# Patient Record
Sex: MALE | Race: AMERICAN INDIAN OR ALASKA NATIVE | NOT HISPANIC OR LATINO | Employment: FULL TIME | URBAN - METROPOLITAN AREA
[De-identification: names, ages, dates, MRNs, and addresses within clinical notes are randomized per-mention and may not be internally consistent; named-entity substitution may affect disease eponyms.]

---

## 2023-04-25 LAB
ALBUMIN (MG/L) IN URINE: 37.3 MG/L
ALBUMIN/CREATININE (UG/MG) IN URINE: 21.9 UG/MG CRT (ref 0–30)
ANION GAP IN SER/PLAS: 13 MMOL/L (ref 10–20)
APPEARANCE, URINE: CLEAR
BILIRUBIN, URINE: NEGATIVE
BLOOD, URINE: NEGATIVE
C REACTIVE PROTEIN (MG/L) IN SER/PLAS BY HIGH SENSIT: 1 MG/L
CALCIUM (MG/DL) IN SER/PLAS: 9.9 MG/DL (ref 8.6–10.6)
CARBON DIOXIDE, TOTAL (MMOL/L) IN SER/PLAS: 29 MMOL/L (ref 21–32)
CHLORIDE (MMOL/L) IN SER/PLAS: 101 MMOL/L (ref 98–107)
CHOLESTEROL (MG/DL) IN SER/PLAS: 109 MG/DL (ref 0–199)
CHOLESTEROL IN HDL (MG/DL) IN SER/PLAS: 28.7 MG/DL
CHOLESTEROL/HDL RATIO: 3.8
COLOR, URINE: YELLOW
CREATININE (MG/DL) IN SER/PLAS: 0.89 MG/DL (ref 0.5–1.3)
CREATININE (MG/DL) IN URINE: 170 MG/DL (ref 20–370)
ESTIMATED AVERAGE GLUCOSE FOR HBA1C: 163 MG/DL
GFR MALE: >90 ML/MIN/1.73M2
GLUCOSE (MG/DL) IN SER/PLAS: 210 MG/DL (ref 74–99)
GLUCOSE, URINE: NEGATIVE MG/DL
HEMOGLOBIN A1C/HEMOGLOBIN TOTAL IN BLOOD: 7.3 %
KETONES, URINE: NEGATIVE MG/DL
LDL: 54 MG/DL (ref 0–99)
LEUKOCYTE ESTERASE, URINE: NEGATIVE
NATRIURETIC PEPTIDE B (PG/ML) IN SER/PLAS: 96 PG/ML (ref 0–99)
NITRITE, URINE: NEGATIVE
PH, URINE: 5 (ref 5–8)
POTASSIUM (MMOL/L) IN SER/PLAS: 4 MMOL/L (ref 3.5–5.3)
PROSTATE SPECIFIC AG (NG/ML) IN SER/PLAS: 0.69 NG/ML (ref 0–4)
PROTEIN, URINE: NEGATIVE MG/DL
SODIUM (MMOL/L) IN SER/PLAS: 139 MMOL/L (ref 136–145)
SPECIFIC GRAVITY, URINE: 1.02 (ref 1–1.03)
TRIGLYCERIDE (MG/DL) IN SER/PLAS: 134 MG/DL (ref 0–149)
UREA NITROGEN (MG/DL) IN SER/PLAS: 16 MG/DL (ref 6–23)
UROBILINOGEN, URINE: <2 MG/DL (ref 0–1.9)
VLDL: 27 MG/DL (ref 0–40)

## 2023-04-27 LAB — LIPOPROTEIN A SER/PLAS: <6 MG/DL

## 2023-04-29 LAB
CHOLESTEROL, TOTAL(NMR): 113 MG/DL (ref 100–199)
HDL-C(NMR): 26 MG/DL
HDL-P (TOTAL)(NMR): 22.2 UMOL/L
LDL AND HDL PARTICLES -DATA CONVERSION: ABNORMAL
LDL SIZE(NMR): 20.6 NM
LDL-C (NIH CALC)(NMR): 64 MG/DL (ref 0–99)
LDL-P(NMR): 874 NMOL/L
LP-IR SCORE(NMR): 76
SMALL LDL-P(NMR): 357 NMOL/L
TRIGLYCERIDES(NMR): 124 MG/DL (ref 0–149)

## 2023-05-22 ENCOUNTER — HOSPITAL ENCOUNTER (OUTPATIENT)
Dept: DATA CONVERSION | Facility: HOSPITAL | Age: 63
End: 2023-05-22
Attending: INTERNAL MEDICINE | Admitting: INTERNAL MEDICINE

## 2023-05-22 DIAGNOSIS — G47.33 OBSTRUCTIVE SLEEP APNEA (ADULT) (PEDIATRIC): ICD-10-CM

## 2023-05-22 DIAGNOSIS — Z87.891 PERSONAL HISTORY OF NICOTINE DEPENDENCE: ICD-10-CM

## 2023-05-22 DIAGNOSIS — I25.118 ATHEROSCLEROTIC HEART DISEASE OF NATIVE CORONARY ARTERY WITH OTHER FORMS OF ANGINA PECTORIS (CMS-HCC): ICD-10-CM

## 2023-05-22 DIAGNOSIS — E66.9 OBESITY, UNSPECIFIED: ICD-10-CM

## 2023-05-22 DIAGNOSIS — I10 ESSENTIAL (PRIMARY) HYPERTENSION: ICD-10-CM

## 2023-05-22 DIAGNOSIS — I35.0 NONRHEUMATIC AORTIC (VALVE) STENOSIS: ICD-10-CM

## 2023-05-22 DIAGNOSIS — E78.5 HYPERLIPIDEMIA, UNSPECIFIED: ICD-10-CM

## 2023-05-22 DIAGNOSIS — I71.21 ANEURYSM OF THE ASCENDING AORTA, WITHOUT RUPTURE (CMS-HCC): ICD-10-CM

## 2023-05-22 DIAGNOSIS — E11.51 TYPE 2 DIABETES MELLITUS WITH DIABETIC PERIPHERAL ANGIOPATHY WITHOUT GANGRENE (MULTI): ICD-10-CM

## 2023-05-22 DIAGNOSIS — Z79.84 LONG TERM (CURRENT) USE OF ORAL HYPOGLYCEMIC DRUGS: ICD-10-CM

## 2023-05-22 LAB
ERYTHROCYTE DISTRIBUTION WIDTH (RATIO) BY AUTOMATED COUNT: 12.5 % (ref 11.5–14.5)
ERYTHROCYTE MEAN CORPUSCULAR HEMOGLOBIN CONCENTRATION (G/DL) BY AUTOMATED: 34.8 G/DL (ref 32–36)
ERYTHROCYTE MEAN CORPUSCULAR VOLUME (FL) BY AUTOMATED COUNT: 90 FL (ref 80–100)
ERYTHROCYTES (10*6/UL) IN BLOOD BY AUTOMATED COUNT: 4.77 X10E12/L (ref 4.5–5.9)
HEMATOCRIT (%) IN BLOOD BY AUTOMATED COUNT: 42.8 % (ref 41–52)
HEMOGLOBIN (G/DL) IN BLOOD: 14.9 G/DL (ref 13.5–17.5)
LEUKOCYTES (10*3/UL) IN BLOOD BY AUTOMATED COUNT: 8.5 X10E9/L (ref 4.4–11.3)
NRBC (PER 100 WBCS) BY AUTOMATED COUNT: 0 /100 WBC (ref 0–0)
PLATELETS (10*3/UL) IN BLOOD AUTOMATED COUNT: 289 X10E9/L (ref 150–450)

## 2023-05-23 LAB
ATRIAL RATE: 73 BPM
P AXIS: 20 DEGREES
P OFFSET: 172 MS
P ONSET: 104 MS
PR INTERVAL: 176 MS
Q ONSET: 215 MS
QRS COUNT: 12 BEATS
QRS DURATION: 116 MS
QT INTERVAL: 420 MS
QTC CALCULATION(BAZETT): 462 MS
QTC FREDERICIA: 448 MS
R AXIS: -12 DEGREES
T AXIS: 140 DEGREES
T OFFSET: 425 MS
VENTRICULAR RATE: 73 BPM

## 2023-09-26 ENCOUNTER — OFFICE VISIT (OUTPATIENT)
Dept: PRIMARY CARE | Facility: CLINIC | Age: 63
End: 2023-09-26
Payer: COMMERCIAL

## 2023-09-26 VITALS
DIASTOLIC BLOOD PRESSURE: 77 MMHG | SYSTOLIC BLOOD PRESSURE: 113 MMHG | WEIGHT: 297 LBS | HEART RATE: 73 BPM | BODY MASS INDEX: 36.93 KG/M2 | OXYGEN SATURATION: 94 % | HEIGHT: 75 IN

## 2023-09-26 DIAGNOSIS — I10 BENIGN ESSENTIAL HYPERTENSION: ICD-10-CM

## 2023-09-26 DIAGNOSIS — E11.69 TYPE 2 DIABETES MELLITUS WITH OTHER SPECIFIED COMPLICATION, WITHOUT LONG-TERM CURRENT USE OF INSULIN (MULTI): Primary | ICD-10-CM

## 2023-09-26 DIAGNOSIS — E78.00 HYPERCHOLESTEREMIA: ICD-10-CM

## 2023-09-26 DIAGNOSIS — R73.9 HYPERGLYCEMIA: ICD-10-CM

## 2023-09-26 PROCEDURE — 3078F DIAST BP <80 MM HG: CPT | Performed by: FAMILY MEDICINE

## 2023-09-26 PROCEDURE — 3008F BODY MASS INDEX DOCD: CPT | Performed by: FAMILY MEDICINE

## 2023-09-26 PROCEDURE — 3044F HG A1C LEVEL LT 7.0%: CPT | Performed by: FAMILY MEDICINE

## 2023-09-26 PROCEDURE — 99214 OFFICE O/P EST MOD 30 MIN: CPT | Performed by: FAMILY MEDICINE

## 2023-09-26 PROCEDURE — 4010F ACE/ARB THERAPY RXD/TAKEN: CPT | Performed by: FAMILY MEDICINE

## 2023-09-26 PROCEDURE — 3074F SYST BP LT 130 MM HG: CPT | Performed by: FAMILY MEDICINE

## 2023-09-26 RX ORDER — PANTOPRAZOLE SODIUM 40 MG/1
40 TABLET, DELAYED RELEASE ORAL
COMMUNITY
Start: 2023-09-14 | End: 2023-11-02 | Stop reason: SDUPTHER

## 2023-09-26 RX ORDER — ALBUTEROL SULFATE 90 UG/1
AEROSOL, METERED RESPIRATORY (INHALATION)
COMMUNITY
End: 2023-11-02 | Stop reason: ALTCHOICE

## 2023-09-26 RX ORDER — METOPROLOL TARTRATE 50 MG/1
50 TABLET ORAL 2 TIMES DAILY
COMMUNITY
Start: 2023-09-14 | End: 2023-11-08 | Stop reason: SDUPTHER

## 2023-09-26 RX ORDER — METFORMIN HYDROCHLORIDE 500 MG/1
500 TABLET ORAL
COMMUNITY
Start: 2023-09-14 | End: 2023-11-08 | Stop reason: SDUPTHER

## 2023-09-26 RX ORDER — HYDROCHLOROTHIAZIDE 25 MG/1
TABLET ORAL
COMMUNITY
End: 2023-10-31 | Stop reason: SDUPTHER

## 2023-09-26 RX ORDER — ATORVASTATIN CALCIUM 80 MG/1
80 TABLET, FILM COATED ORAL DAILY
COMMUNITY
Start: 2023-09-14 | End: 2023-10-31 | Stop reason: SDUPTHER

## 2023-09-26 RX ORDER — AMIODARONE HYDROCHLORIDE 200 MG/1
TABLET ORAL
COMMUNITY
Start: 2023-09-14 | End: 2023-11-02 | Stop reason: SDUPTHER

## 2023-09-26 RX ORDER — AMLODIPINE BESYLATE 5 MG/1
5 TABLET ORAL DAILY
COMMUNITY
End: 2023-10-31 | Stop reason: SDUPTHER

## 2023-09-26 RX ORDER — LOSARTAN POTASSIUM 100 MG/1
100 TABLET ORAL DAILY
COMMUNITY
Start: 2023-06-16 | End: 2023-10-25 | Stop reason: ENTERED-IN-ERROR

## 2023-09-26 ASSESSMENT — ENCOUNTER SYMPTOMS
APPETITE CHANGE: 1
MUSCULOSKELETAL NEGATIVE: 1
RESPIRATORY NEGATIVE: 1
FATIGUE: 1

## 2023-09-26 NOTE — PROGRESS NOTES
Subjective   Patient ID: Jules Neves is a 63 y.o. male who presents for Follow-up (Nursing home).  HPI  Sp hosp [facility] doing better now  Review of Systems   Constitutional:  Positive for appetite change and fatigue.   HENT: Negative.     Respiratory: Negative.     Cardiovascular:  Positive for chest pain.         chest pain sp surgery    Genitourinary: Negative.    Musculoskeletal: Negative.        Objective   Physical Exam  Constitutional:       Appearance: Normal appearance.   HENT:      Head: Normocephalic.      Nose: Nose normal.      Mouth/Throat:      Mouth: Mucous membranes are moist.   Eyes:      Extraocular Movements: Extraocular movements intact.      Pupils: Pupils are equal, round, and reactive to light.   Cardiovascular:      Rate and Rhythm: Normal rate and regular rhythm.   Pulmonary:      Effort: Pulmonary effort is normal.   Musculoskeletal:         General: Normal range of motion.      Cervical back: Normal range of motion.   Skin:     General: Skin is warm and dry.   Neurological:      Mental Status: He is alert.         Assessment/Plan   Diagnoses and all orders for this visit:  Type 2 diabetes mellitus with other specified complication, without long-term current use of insulin (CMS/[facility])  -     Hemoglobin A1C; Future  -     Basic Metabolic Panel; Future  Hyperglycemia  Hypercholesteremia  Benign essential hypertension

## 2023-10-04 DIAGNOSIS — Z95.828 S/P ASCENDING AORTIC REPLACEMENT: Primary | ICD-10-CM

## 2023-10-17 ENCOUNTER — TELEPHONE (OUTPATIENT)
Dept: PRIMARY CARE | Facility: CLINIC | Age: 63
End: 2023-10-17
Payer: COMMERCIAL

## 2023-10-17 NOTE — TELEPHONE ENCOUNTER
Home nurse called for this patient. Said he has been feeling light headed on Metformin. They would like to start checking his blood sugar. They were hoping you could call in a meter, test strips, and lancets.

## 2023-10-20 DIAGNOSIS — E11.69 TYPE 2 DIABETES MELLITUS WITH OTHER SPECIFIED COMPLICATION, WITHOUT LONG-TERM CURRENT USE OF INSULIN (MULTI): Primary | ICD-10-CM

## 2023-10-24 ENCOUNTER — APPOINTMENT (OUTPATIENT)
Dept: RADIOLOGY | Facility: HOSPITAL | Age: 63
End: 2023-10-24
Payer: COMMERCIAL

## 2023-10-24 ENCOUNTER — HOSPITAL ENCOUNTER (OUTPATIENT)
Facility: HOSPITAL | Age: 63
Setting detail: OBSERVATION
Discharge: HOME | End: 2023-10-25
Attending: EMERGENCY MEDICINE | Admitting: INTERNAL MEDICINE
Payer: COMMERCIAL

## 2023-10-24 DIAGNOSIS — R79.89 ELEVATED BRAIN NATRIURETIC PEPTIDE (BNP) LEVEL: ICD-10-CM

## 2023-10-24 DIAGNOSIS — R79.89 ELEVATED TROPONIN: ICD-10-CM

## 2023-10-24 DIAGNOSIS — R00.2 PALPITATIONS: Primary | ICD-10-CM

## 2023-10-24 DIAGNOSIS — I97.89 PERICARDIAL EFFUSION AFTER OPERATIVE PROCEDURE (HHS-HCC): ICD-10-CM

## 2023-10-24 DIAGNOSIS — I31.39 PERICARDIAL EFFUSION AFTER OPERATIVE PROCEDURE (HHS-HCC): ICD-10-CM

## 2023-10-24 LAB
ALBUMIN SERPL BCP-MCNC: 4.2 G/DL (ref 3.4–5)
ALP SERPL-CCNC: 74 U/L (ref 33–136)
ALT SERPL W P-5'-P-CCNC: 30 U/L (ref 10–52)
ANION GAP SERPL CALC-SCNC: 14 MMOL/L (ref 10–20)
AST SERPL W P-5'-P-CCNC: 31 U/L (ref 9–39)
BASOPHILS # BLD AUTO: 0.06 X10*3/UL (ref 0–0.1)
BASOPHILS NFR BLD AUTO: 0.7 %
BILIRUB SERPL-MCNC: 0.8 MG/DL (ref 0–1.2)
BNP SERPL-MCNC: 560 PG/ML (ref 0–99)
BUN SERPL-MCNC: 9 MG/DL (ref 6–23)
CALCIUM SERPL-MCNC: 9.6 MG/DL (ref 8.6–10.3)
CARDIAC TROPONIN I PNL SERPL HS: 78 NG/L (ref 0–20)
CARDIAC TROPONIN I PNL SERPL HS: 80 NG/L (ref 0–20)
CHLORIDE SERPL-SCNC: 100 MMOL/L (ref 98–107)
CO2 SERPL-SCNC: 25 MMOL/L (ref 21–32)
CREAT SERPL-MCNC: 0.81 MG/DL (ref 0.5–1.3)
EOSINOPHIL # BLD AUTO: 0.35 X10*3/UL (ref 0–0.7)
EOSINOPHIL NFR BLD AUTO: 4.1 %
ERYTHROCYTE [DISTWIDTH] IN BLOOD BY AUTOMATED COUNT: 13.1 % (ref 11.5–14.5)
GFR SERPL CREATININE-BSD FRML MDRD: >90 ML/MIN/1.73M*2
GLUCOSE SERPL-MCNC: 132 MG/DL (ref 74–99)
HCT VFR BLD AUTO: 43.9 % (ref 41–52)
HGB BLD-MCNC: 15 G/DL (ref 13.5–17.5)
IMM GRANULOCYTES # BLD AUTO: 0.02 X10*3/UL (ref 0–0.7)
IMM GRANULOCYTES NFR BLD AUTO: 0.2 % (ref 0–0.9)
LYMPHOCYTES # BLD AUTO: 2.37 X10*3/UL (ref 1.2–4.8)
LYMPHOCYTES NFR BLD AUTO: 27.9 %
MAGNESIUM SERPL-MCNC: 1.71 MG/DL (ref 1.6–2.4)
MCH RBC QN AUTO: 30.6 PG (ref 26–34)
MCHC RBC AUTO-ENTMCNC: 34.2 G/DL (ref 32–36)
MCV RBC AUTO: 90 FL (ref 80–100)
MONOCYTES # BLD AUTO: 0.67 X10*3/UL (ref 0.1–1)
MONOCYTES NFR BLD AUTO: 7.9 %
NEUTROPHILS # BLD AUTO: 5.03 X10*3/UL (ref 1.2–7.7)
NEUTROPHILS NFR BLD AUTO: 59.2 %
NRBC BLD-RTO: 0 /100 WBCS (ref 0–0)
PLATELET # BLD AUTO: 252 X10*3/UL (ref 150–450)
PMV BLD AUTO: 10.1 FL (ref 7.5–11.5)
POTASSIUM SERPL-SCNC: 3.2 MMOL/L (ref 3.5–5.3)
PROT SERPL-MCNC: 6.9 G/DL (ref 6.4–8.2)
RBC # BLD AUTO: 4.9 X10*6/UL (ref 4.5–5.9)
SODIUM SERPL-SCNC: 136 MMOL/L (ref 136–145)
WBC # BLD AUTO: 8.5 X10*3/UL (ref 4.4–11.3)

## 2023-10-24 PROCEDURE — 2500000004 HC RX 250 GENERAL PHARMACY W/ HCPCS (ALT 636 FOR OP/ED): Performed by: EMERGENCY MEDICINE

## 2023-10-24 PROCEDURE — 71045 X-RAY EXAM CHEST 1 VIEW: CPT | Mod: FY

## 2023-10-24 PROCEDURE — 36415 COLL VENOUS BLD VENIPUNCTURE: CPT | Performed by: NURSE PRACTITIONER

## 2023-10-24 PROCEDURE — 85025 COMPLETE CBC W/AUTO DIFF WBC: CPT | Performed by: NURSE PRACTITIONER

## 2023-10-24 PROCEDURE — 99285 EMERGENCY DEPT VISIT HI MDM: CPT | Mod: 25 | Performed by: EMERGENCY MEDICINE

## 2023-10-24 PROCEDURE — 71045 X-RAY EXAM CHEST 1 VIEW: CPT | Performed by: RADIOLOGY

## 2023-10-24 PROCEDURE — 83880 ASSAY OF NATRIURETIC PEPTIDE: CPT | Performed by: NURSE PRACTITIONER

## 2023-10-24 PROCEDURE — 83735 ASSAY OF MAGNESIUM: CPT | Performed by: NURSE PRACTITIONER

## 2023-10-24 PROCEDURE — 80053 COMPREHEN METABOLIC PANEL: CPT | Performed by: NURSE PRACTITIONER

## 2023-10-24 PROCEDURE — 84484 ASSAY OF TROPONIN QUANT: CPT | Performed by: NURSE PRACTITIONER

## 2023-10-24 PROCEDURE — 71275 CT ANGIOGRAPHY CHEST: CPT

## 2023-10-24 RX ORDER — POTASSIUM CHLORIDE 20 MEQ/1
40 TABLET, EXTENDED RELEASE ORAL ONCE
Status: COMPLETED | OUTPATIENT
Start: 2023-10-24 | End: 2023-10-24

## 2023-10-24 RX ADMIN — POTASSIUM CHLORIDE 40 MEQ: 1500 TABLET, EXTENDED RELEASE ORAL at 23:21

## 2023-10-24 ASSESSMENT — COLUMBIA-SUICIDE SEVERITY RATING SCALE - C-SSRS
1. IN THE PAST MONTH, HAVE YOU WISHED YOU WERE DEAD OR WISHED YOU COULD GO TO SLEEP AND NOT WAKE UP?: NO
6. HAVE YOU EVER DONE ANYTHING, STARTED TO DO ANYTHING, OR PREPARED TO DO ANYTHING TO END YOUR LIFE?: NO
2. HAVE YOU ACTUALLY HAD ANY THOUGHTS OF KILLING YOURSELF?: NO

## 2023-10-24 ASSESSMENT — LIFESTYLE VARIABLES
EVER FELT BAD OR GUILTY ABOUT YOUR DRINKING: NO
EVER HAD A DRINK FIRST THING IN THE MORNING TO STEADY YOUR NERVES TO GET RID OF A HANGOVER: NO
HAVE PEOPLE ANNOYED YOU BY CRITICIZING YOUR DRINKING: NO
HAVE YOU EVER FELT YOU SHOULD CUT DOWN ON YOUR DRINKING: NO
REASON UNABLE TO ASSESS: YES

## 2023-10-25 VITALS
HEIGHT: 75 IN | RESPIRATION RATE: 16 BRPM | OXYGEN SATURATION: 98 % | DIASTOLIC BLOOD PRESSURE: 70 MMHG | TEMPERATURE: 98.4 F | BODY MASS INDEX: 36.06 KG/M2 | SYSTOLIC BLOOD PRESSURE: 146 MMHG | HEART RATE: 73 BPM | WEIGHT: 290 LBS

## 2023-10-25 PROBLEM — R00.2 PALPITATIONS: Status: ACTIVE | Noted: 2023-10-25

## 2023-10-25 LAB
ANION GAP SERPL CALC-SCNC: 14 MMOL/L (ref 10–20)
BUN SERPL-MCNC: 8 MG/DL (ref 6–23)
CALCIUM SERPL-MCNC: 9 MG/DL (ref 8.6–10.3)
CHLORIDE SERPL-SCNC: 101 MMOL/L (ref 98–107)
CO2 SERPL-SCNC: 27 MMOL/L (ref 21–32)
CREAT SERPL-MCNC: 0.73 MG/DL (ref 0.5–1.3)
ERYTHROCYTE [DISTWIDTH] IN BLOOD BY AUTOMATED COUNT: 13.3 % (ref 11.5–14.5)
GFR SERPL CREATININE-BSD FRML MDRD: >90 ML/MIN/1.73M*2
GLUCOSE BLD MANUAL STRIP-MCNC: 102 MG/DL (ref 74–99)
GLUCOSE SERPL-MCNC: 96 MG/DL (ref 74–99)
HCT VFR BLD AUTO: 42.6 % (ref 41–52)
HGB BLD-MCNC: 14.4 G/DL (ref 13.5–17.5)
HOLD SPECIMEN: NORMAL
MCH RBC QN AUTO: 31.2 PG (ref 26–34)
MCHC RBC AUTO-ENTMCNC: 33.8 G/DL (ref 32–36)
MCV RBC AUTO: 92 FL (ref 80–100)
NRBC BLD-RTO: 0 /100 WBCS (ref 0–0)
PLATELET # BLD AUTO: 251 X10*3/UL (ref 150–450)
PMV BLD AUTO: 10.1 FL (ref 7.5–11.5)
POTASSIUM SERPL-SCNC: 3.7 MMOL/L (ref 3.5–5.3)
RBC # BLD AUTO: 4.62 X10*6/UL (ref 4.5–5.9)
SODIUM SERPL-SCNC: 138 MMOL/L (ref 136–145)
WBC # BLD AUTO: 8.1 X10*3/UL (ref 4.4–11.3)

## 2023-10-25 PROCEDURE — 2500000001 HC RX 250 WO HCPCS SELF ADMINISTERED DRUGS (ALT 637 FOR MEDICARE OP): Performed by: INTERNAL MEDICINE

## 2023-10-25 PROCEDURE — 71275 CT ANGIOGRAPHY CHEST: CPT | Performed by: RADIOLOGY

## 2023-10-25 PROCEDURE — 36415 COLL VENOUS BLD VENIPUNCTURE: CPT | Performed by: INTERNAL MEDICINE

## 2023-10-25 PROCEDURE — 82947 ASSAY GLUCOSE BLOOD QUANT: CPT

## 2023-10-25 PROCEDURE — 99222 1ST HOSP IP/OBS MODERATE 55: CPT

## 2023-10-25 PROCEDURE — 80048 BASIC METABOLIC PNL TOTAL CA: CPT | Performed by: INTERNAL MEDICINE

## 2023-10-25 PROCEDURE — G0378 HOSPITAL OBSERVATION PER HR: HCPCS

## 2023-10-25 PROCEDURE — 85027 COMPLETE CBC AUTOMATED: CPT | Performed by: INTERNAL MEDICINE

## 2023-10-25 PROCEDURE — 2500000001 HC RX 250 WO HCPCS SELF ADMINISTERED DRUGS (ALT 637 FOR MEDICARE OP)

## 2023-10-25 PROCEDURE — 2550000001 HC RX 255 CONTRASTS: Performed by: EMERGENCY MEDICINE

## 2023-10-25 PROCEDURE — 99236 HOSP IP/OBS SAME DATE HI 85: CPT | Performed by: INTERNAL MEDICINE

## 2023-10-25 RX ORDER — ACETAMINOPHEN 160 MG/5ML
650 SOLUTION ORAL EVERY 4 HOURS PRN
Status: DISCONTINUED | OUTPATIENT
Start: 2023-10-25 | End: 2023-10-25 | Stop reason: HOSPADM

## 2023-10-25 RX ORDER — ONDANSETRON HYDROCHLORIDE 2 MG/ML
4 INJECTION, SOLUTION INTRAVENOUS EVERY 8 HOURS PRN
Status: DISCONTINUED | OUTPATIENT
Start: 2023-10-25 | End: 2023-10-25 | Stop reason: HOSPADM

## 2023-10-25 RX ORDER — NAPROXEN SODIUM 220 MG/1
81 TABLET, FILM COATED ORAL DAILY
Status: DISCONTINUED | OUTPATIENT
Start: 2023-10-25 | End: 2023-10-25 | Stop reason: HOSPADM

## 2023-10-25 RX ORDER — ACETAMINOPHEN 650 MG/1
650 SUPPOSITORY RECTAL EVERY 4 HOURS PRN
Status: DISCONTINUED | OUTPATIENT
Start: 2023-10-25 | End: 2023-10-25 | Stop reason: HOSPADM

## 2023-10-25 RX ORDER — FAMOTIDINE 10 MG/ML
20 INJECTION INTRAVENOUS 2 TIMES DAILY
Status: DISCONTINUED | OUTPATIENT
Start: 2023-10-25 | End: 2023-10-25 | Stop reason: HOSPADM

## 2023-10-25 RX ORDER — DEXTROSE MONOHYDRATE 100 MG/ML
0.3 INJECTION, SOLUTION INTRAVENOUS ONCE AS NEEDED
Status: DISCONTINUED | OUTPATIENT
Start: 2023-10-25 | End: 2023-10-25 | Stop reason: HOSPADM

## 2023-10-25 RX ORDER — FAMOTIDINE 20 MG/1
20 TABLET, FILM COATED ORAL 2 TIMES DAILY
Status: DISCONTINUED | OUTPATIENT
Start: 2023-10-25 | End: 2023-10-25 | Stop reason: HOSPADM

## 2023-10-25 RX ORDER — POLYETHYLENE GLYCOL 3350 17 G/17G
17 POWDER, FOR SOLUTION ORAL DAILY PRN
Status: DISCONTINUED | OUTPATIENT
Start: 2023-10-25 | End: 2023-10-25 | Stop reason: HOSPADM

## 2023-10-25 RX ORDER — ONDANSETRON 4 MG/1
4 TABLET, FILM COATED ORAL EVERY 8 HOURS PRN
Status: DISCONTINUED | OUTPATIENT
Start: 2023-10-25 | End: 2023-10-25 | Stop reason: HOSPADM

## 2023-10-25 RX ORDER — TALC
3 POWDER (GRAM) TOPICAL DAILY
Status: DISCONTINUED | OUTPATIENT
Start: 2023-10-25 | End: 2023-10-25 | Stop reason: HOSPADM

## 2023-10-25 RX ORDER — INSULIN LISPRO 100 [IU]/ML
0-10 INJECTION, SOLUTION INTRAVENOUS; SUBCUTANEOUS
Status: DISCONTINUED | OUTPATIENT
Start: 2023-10-25 | End: 2023-10-25 | Stop reason: HOSPADM

## 2023-10-25 RX ORDER — ENOXAPARIN SODIUM 100 MG/ML
40 INJECTION SUBCUTANEOUS EVERY 24 HOURS
Status: DISCONTINUED | OUTPATIENT
Start: 2023-10-25 | End: 2023-10-25 | Stop reason: HOSPADM

## 2023-10-25 RX ORDER — POTASSIUM CHLORIDE 1.5 G/1.58G
40 POWDER, FOR SOLUTION ORAL ONCE
Status: COMPLETED | OUTPATIENT
Start: 2023-10-25 | End: 2023-10-25

## 2023-10-25 RX ORDER — ACETAMINOPHEN 325 MG/1
650 TABLET ORAL EVERY 4 HOURS PRN
Status: DISCONTINUED | OUTPATIENT
Start: 2023-10-25 | End: 2023-10-25 | Stop reason: HOSPADM

## 2023-10-25 RX ORDER — DEXTROSE 50 % IN WATER (D50W) INTRAVENOUS SYRINGE
25
Status: DISCONTINUED | OUTPATIENT
Start: 2023-10-25 | End: 2023-10-25 | Stop reason: HOSPADM

## 2023-10-25 RX ADMIN — FAMOTIDINE 20 MG: 20 TABLET ORAL at 09:17

## 2023-10-25 RX ADMIN — POTASSIUM CHLORIDE 40 MEQ: 1.5 POWDER, FOR SOLUTION ORAL at 09:20

## 2023-10-25 RX ADMIN — ASPIRIN 81 MG 81 MG: 81 TABLET ORAL at 09:17

## 2023-10-25 RX ADMIN — IOHEXOL 80 ML: 350 INJECTION, SOLUTION INTRAVENOUS at 00:06

## 2023-10-25 SDOH — SOCIAL STABILITY: SOCIAL INSECURITY: HAS ANYONE EVER THREATENED TO HURT YOUR FAMILY OR YOUR PETS?: NO

## 2023-10-25 SDOH — SOCIAL STABILITY: SOCIAL INSECURITY: ABUSE: ADULT

## 2023-10-25 SDOH — SOCIAL STABILITY: SOCIAL INSECURITY: HAVE YOU HAD THOUGHTS OF HARMING ANYONE ELSE?: NO

## 2023-10-25 SDOH — SOCIAL STABILITY: SOCIAL INSECURITY: ARE YOU OR HAVE YOU BEEN THREATENED OR ABUSED PHYSICALLY, EMOTIONALLY, OR SEXUALLY BY ANYONE?: NO

## 2023-10-25 SDOH — SOCIAL STABILITY: SOCIAL INSECURITY: DO YOU FEEL UNSAFE GOING BACK TO THE PLACE WHERE YOU ARE LIVING?: NO

## 2023-10-25 SDOH — SOCIAL STABILITY: SOCIAL INSECURITY: ARE THERE ANY APPARENT SIGNS OF INJURIES/BEHAVIORS THAT COULD BE RELATED TO ABUSE/NEGLECT?: NO

## 2023-10-25 SDOH — SOCIAL STABILITY: SOCIAL INSECURITY: WERE YOU ABLE TO COMPLETE ALL THE BEHAVIORAL HEALTH SCREENINGS?: YES

## 2023-10-25 SDOH — SOCIAL STABILITY: SOCIAL INSECURITY: DO YOU FEEL ANYONE HAS EXPLOITED OR TAKEN ADVANTAGE OF YOU FINANCIALLY OR OF YOUR PERSONAL PROPERTY?: NO

## 2023-10-25 SDOH — SOCIAL STABILITY: SOCIAL INSECURITY: DOES ANYONE TRY TO KEEP YOU FROM HAVING/CONTACTING OTHER FRIENDS OR DOING THINGS OUTSIDE YOUR HOME?: NO

## 2023-10-25 ASSESSMENT — COLUMBIA-SUICIDE SEVERITY RATING SCALE - C-SSRS
2. HAVE YOU ACTUALLY HAD ANY THOUGHTS OF KILLING YOURSELF?: NO
6. HAVE YOU EVER DONE ANYTHING, STARTED TO DO ANYTHING, OR PREPARED TO DO ANYTHING TO END YOUR LIFE?: NO
1. IN THE PAST MONTH, HAVE YOU WISHED YOU WERE DEAD OR WISHED YOU COULD GO TO SLEEP AND NOT WAKE UP?: NO

## 2023-10-25 ASSESSMENT — ENCOUNTER SYMPTOMS
DYSURIA: 0
CONFUSION: 0
VOMITING: 0
EYE PAIN: 0
ABDOMINAL PAIN: 0
HEMATURIA: 0
FREQUENCY: 0
HEADACHES: 0
WOUND: 0
FLANK PAIN: 0
CHILLS: 0
BACK PAIN: 0
DIFFICULTY URINATING: 0
DIARRHEA: 0
NECK PAIN: 0
WHEEZING: 0
SHORTNESS OF BREATH: 0
SORE THROAT: 0
FEVER: 0
LIGHT-HEADEDNESS: 0
CONSTIPATION: 0
SINUS PRESSURE: 0
FACIAL SWELLING: 0
PALPITATIONS: 0
COUGH: 0
EYE REDNESS: 0
HALLUCINATIONS: 0
SLEEP DISTURBANCE: 0
JOINT SWELLING: 0
POLYDIPSIA: 0
SEIZURES: 0
NAUSEA: 0
BLOOD IN STOOL: 0
DIZZINESS: 0
APPETITE CHANGE: 0

## 2023-10-25 ASSESSMENT — ACTIVITIES OF DAILY LIVING (ADL)
FEEDING YOURSELF: INDEPENDENT
PATIENT'S MEMORY ADEQUATE TO SAFELY COMPLETE DAILY ACTIVITIES?: YES
DRESSING YOURSELF: INDEPENDENT
ADEQUATE_TO_COMPLETE_ADL: YES
BATHING: INDEPENDENT
JUDGMENT_ADEQUATE_SAFELY_COMPLETE_DAILY_ACTIVITIES: YES
LACK_OF_TRANSPORTATION: NO
HEARING - RIGHT EAR: FUNCTIONAL
WALKS IN HOME: NEEDS ASSISTANCE
HEARING - LEFT EAR: FUNCTIONAL
GROOMING: INDEPENDENT
TOILETING: NEEDS ASSISTANCE

## 2023-10-25 ASSESSMENT — COGNITIVE AND FUNCTIONAL STATUS - GENERAL
WALKING IN HOSPITAL ROOM: A LITTLE
MOBILITY SCORE: 22
PATIENT BASELINE BEDBOUND: NO
TOILETING: A LITTLE
CLIMB 3 TO 5 STEPS WITH RAILING: A LITTLE
DAILY ACTIVITIY SCORE: 23

## 2023-10-25 ASSESSMENT — LIFESTYLE VARIABLES
HOW OFTEN DO YOU HAVE 6 OR MORE DRINKS ON ONE OCCASION: NEVER
SKIP TO QUESTIONS 9-10: 1
HOW OFTEN DO YOU HAVE A DRINK CONTAINING ALCOHOL: NEVER
HOW MANY STANDARD DRINKS CONTAINING ALCOHOL DO YOU HAVE ON A TYPICAL DAY: PATIENT DOES NOT DRINK
PRESCIPTION_ABUSE_PAST_12_MONTHS: NO
SUBSTANCE_ABUSE_PAST_12_MONTHS: NO
AUDIT-C TOTAL SCORE: 0
AUDIT-C TOTAL SCORE: 0

## 2023-10-25 ASSESSMENT — PATIENT HEALTH QUESTIONNAIRE - PHQ9
2. FEELING DOWN, DEPRESSED OR HOPELESS: NOT AT ALL
1. LITTLE INTEREST OR PLEASURE IN DOING THINGS: NOT AT ALL
SUM OF ALL RESPONSES TO PHQ9 QUESTIONS 1 & 2: 0

## 2023-10-25 ASSESSMENT — PAIN SCALES - GENERAL: PAINLEVEL_OUTOF10: 0 - NO PAIN

## 2023-10-25 ASSESSMENT — PAIN - FUNCTIONAL ASSESSMENT: PAIN_FUNCTIONAL_ASSESSMENT: 0-10

## 2023-10-25 NOTE — CONSULTS
"Reason for consult to Cardiology:  New onset Afib in setting of septic shock     HPI:  Jules Neves is a 63 y.o. male PMHx HTN, HLD, CAD, aortic stenosis with dilated aortic root s/p AVR/root replacement/left atrial appendage ligation end of August 2023, DM2 presents to [facility] ED for \"palpitations\".  He was sitting down watching TV and started to feel palpitations or thrills going into his neck. This lasted for about 4 hours from 4 PM to 8 PM yesterday. Denies any chest pressure, chest pain, shortness of breath, lightheadedness or dizziness. By the time he got to the ED, his symptoms resolved. This has never happened to him before. ECG demonstrated sinus rhythm with first-degree AV block, left ventricular hypertrophy, prolonged /547. CXR: Stable cardiomegaly, no acute pulmonary process. CT angio PE demonstrating no acute PE, postsurgical changes of aortic valve repair with left atrial appendage occluder device noted along with mild retrosternal stranding with trace periaortic fluid likely on the basis of recent surgery, and borderline enlarged heart size. Recent echo in 9/5/2023 with EF of 65 to 70% and moderately concentric left ventricular hypertrophy. Troponin mildly elevated, downtrending.  Home CV meds include amiodarone 200, amlodipine 5, atorvastatin 80, hydrochlorothiazide 25, Lopressor 50.  Patient is on outpatient antiarrhythmics because of new A-fib with RVR which required cardioversion postop to his AVR/root replacement/left atrial appendage ligation.  No previous adverse cardiac events per chart review and patient.  Left heart catheterization in May 2023 demonstrated nonobstructive disease.     A 10 point ROS was performed with the patient denying any complaint at this time aside from those listed in the HPI above.      Past Medical History: As above  Past Surgical History: As above  Family History: Noncontributory  Code Status: confirmed Full per ICU note     Objective:    " "  Medications:  aspirin, 81 mg, oral, Daily  enoxaparin, 40 mg, subcutaneous, q24h  famotidine, 20 mg, oral, BID   Or  famotidine, 20 mg, intravenous, BID  insulin lispro, 0-10 Units, subcutaneous, TID with meals  melatonin, 3 mg, oral, Daily    /70   Pulse 73   Temp 36.9 °C (98.4 °F)   Resp 16   Ht 1.905 m (6' 3\")   Wt 132 kg (290 lb)   SpO2 98%   BMI 36.25 kg/m²      Physical Exam:   GENERAL: Awake/alert/oriented, cooperative, conversant.  HEAD:  Normocephalic, atraumatic.  EYES:  Round and reactive.  ENT:  No nasal discharge, mucous membranes moist and pink.  NECK:  Atraumatic, no meningismus. No JVD or carotid bruits bilaterally.   CARDIOVASCULAR:  HRRR, no murmurs detected on auscultation, S1 and S2 noted.   RESPIRATORY:  CTAB, no rales, rhonchi, wheezes heard.    ABDOMEN:  Soft, no tenderness, nondistended.   EXTREMITIES:  Non-edematous, no lesions visualized. Peripheral pulses in UE and LE intact bilaterally.  SKIN:  Warm and dry, non-cyanotic.   NEUROLOGICAL:  Nonfocal grossly. CNII-XII grossly intact.     Assessment/Plan:  Jules Neves is a 63 y.o. male PMHx HTN, HLD, CAD, aortic stenosis with dilated aortic root s/p AVR/root replacement/left atrial appendage ligation end of August 2023, DM2 presents to [facility] ED for \"palpitations\".     #Left neck palpitation  #Hx of Afib with RVR  #Mild troponinemia, likely 2/2 demand ischemia  [id number] score 2, but s/p left atrial appendage ligation, defer AC  -Outpatient echo  -30 day event monitor   -Continue outpatient antiarrhythmics     Michael Brand,    Internal Medicine PGY-1  "

## 2023-10-25 NOTE — PROGRESS NOTES
Pharmacy Medication History Review    Jules Neves is a 63 y.o. male admitted for Palpitations. Pharmacy reviewed the patient's [id number] medications and allergies for accuracy.    The list below reflectives the updated PTA list. Please review each medication in order reconciliation for additional clarification and justification.    See PTA med list       The list below reflectives the updated allergy list. Please review each documented allergy for additional clarification and justification.  Allergies  Reviewed by Cate Cooley CPhT on 10/25/2023   No Known Allergies         Below are additional concerns with the patient's PTA list.      Cate Cooley CPhT

## 2023-10-25 NOTE — ED PROVIDER NOTES
HPI   Chief Complaint   Patient presents with    Rapid Heart Rate     Pt states that he started having a rapid heart rate at 4pm today , denoes chest pain, 0 sob. HX recent open heart surgery : August 2023       Patient is a 63-year-old male with past medical history of hypertension, hyperlipidemia, diabetes, who recently had a valve replacement and valve repair, who presents ED today due to sensation of palpitations in his neck for the past 4 hours.  Patient states earlier in the day he did have a very brief episode where his forearm had some tingling for less than a minute and went away on its own.  Patient denies any chest pain, shortness of breath, lower extremity swelling, congestion, cough, fevers, chills, nausea, vomiting, diarrhea, constipation, abdominal pain, headache, dizziness, focal numbness or weakness.       used: No                        No data recorded                Patient History   Past Medical History:   Diagnosis Date    Allergy status to unspecified drugs, medicaments and biological substances 11/18/2015    History of adverse drug reaction    Cardiac murmur, unspecified 11/13/2014    Systolic murmur    Encounter for screening for malignant neoplasm of colon 09/01/2016    Screening for colon cancer    Influenza due to other identified influenza virus with other respiratory manifestations 02/21/2018    Influenza A    Nonrheumatic aortic (valve) insufficiency 02/21/2018    Mild aortic insufficiency    Personal history of other diseases of the circulatory system 06/15/2018    History of aortic valve stenosis    Personal history of other diseases of the nervous system and sense organs 08/28/2014    History of labyrinthitis    Personal history of other diseases of the respiratory system 06/08/2015    History of sinusitis    Personal history of other diseases of the respiratory system     History of acute bronchitis    Personal history of other specified conditions 02/19/2018     History of edema    Personal history of other specified conditions 07/11/2016    History of vertigo    Type 2 diabetes mellitus with other specified complication (CMS/[facility]) 12/15/2022    Diabetes mellitus type 2 in obese     Past Surgical History:   Procedure Laterality Date    ANKLE SURGERY  01/28/2018    Ankle Surgery     No family history on file.  Social History     Tobacco Use    Smoking status: Not on file    Smokeless tobacco: Not on file   Substance Use Topics    Alcohol use: Not on file    Drug use: Not on file       Physical Exam   ED Triage Vitals [10/24/23 2204]   Temp Heart Rate Resp BP   36.9 °C (98.4 °F) 82 18 133/89      SpO2 Temp src Heart Rate Source Patient Position   95 % -- -- --      BP Location FiO2 (%)     -- --       Physical Exam  Vitals and nursing note reviewed.   Constitutional:       General: He is not in acute distress.     Appearance: He is well-developed.   HENT:      Head: Normocephalic and atraumatic.   Eyes:      Conjunctiva/sclera: Conjunctivae normal.   Neck:      Vascular: Normal carotid pulses. No carotid bruit.      Trachea: Trachea and phonation normal.   Cardiovascular:      Rate and Rhythm: Normal rate and regular rhythm.      Heart sounds: Murmur heard.      Systolic murmur is present with a grade of 2/6.   Pulmonary:      Effort: Pulmonary effort is normal. No respiratory distress.      Breath sounds: Normal breath sounds.   Abdominal:      Palpations: Abdomen is soft.      Tenderness: There is no abdominal tenderness.   Musculoskeletal:         General: No swelling.      Cervical back: Full passive range of motion without pain, normal range of motion and neck supple. No edema. No pain with movement. Normal range of motion.   Skin:     General: Skin is warm and dry.      Capillary Refill: Capillary refill takes less than 2 seconds.   Neurological:      Mental Status: He is alert.   Psychiatric:         Mood and Affect: Mood normal.         ED Course & MDM   ED Course as  of 10/25/23 0100   Tue Oct 24, 2023   2248 ECG 12 Lead  EKG, my read, 2244  Sinus rhythm with first-degree AV block, left ventricular hypertrophy, prolonged QT, no acute ST elevation or depression.  Ventricular rate-78 BPM [WS]   2248 CBC and Auto Differential  Normal, no anemia, no leukocytosis, no thrombocytopenia [WS]   2306 Magnesium  Normal [WS]   2307 Comprehensive Metabolic Panel(!)  Mild hypokalemia, no other electrolyte imbalance, kidney injury, or liver pathology [WS]   2344 Troponin I, High Sensitivity(!!)  Elevated, will repeat, patient has no signs or symptoms of ACS at this time. [WS]   2345 B-Type Natriuretic Peptide(!)  Moderately elevated, patient is having no shortness of breath, no lower extremity swelling, and no other evidence of fluid overload on chest x-ray. [WS]   2345 XR chest 1 view  Stable cardiomegaly. No acute pulmonary process. [WS]   2345 Given patient's recent surgery, elevated BNP and troponin, we did order a CT angio to rule out PE. [WS]   2358 Troponin I, High Sensitivity(!!): 78  Repeat Trope 78.  Not rapidly escalating. [MK]   Wed Oct 25, 2023   0012 Troponin I, High Sensitivity(!!)  Delta troponin is down trending. [WS]   0050 CT angio chest for pulmonary embolism  1. No acute pulmonary embolism to the 1st subsegmental arterial level.  2. Postsurgical changes of aortic valve repair with left atrial  appendage occluder device noted. There is mild retrosternal stranding  with trace periaortic fluid likely on the basis of recent surgery. No  active contrast extravasation. Attention on follow-up is advised.  3. Heart size is borderline enlarged. Small to moderate pericardial  effusion is new. Correlate with symptomatology for the need for further evaluation with echocardiogram.   [WS]      ED Course User Index  [MK] Rodri Szymanski MD  [WS] Jules Estevez, ERIN-CNP         Diagnoses as of 10/25/23 0100   Elevated troponin   Pericardial effusion after operative procedure    Elevated brain natriuretic peptide (BNP) level       Medical Decision Making  Differential diagnosis: ACS, NSTEMI, arrhythmia, electrolyte abnormality, anemia.  Patient's lab work was concerning given an elevation in troponin, but his repeat was downtrending but still elevated.  There is no troponin to compare this to in the system and I did attempt to find a recent troponin through care everywhere and could not find 1.  Patient's BNP is also elevated, patient has no current signs or symptoms of fluid overload including lower extremity swelling, or shortness of breath on exertion.  Patient's chest x-ray does show cardiomegaly.  Patient CT scan did not show a PE but does show a small to moderate pericardial effusion.  I do believe given his most recent surgery and these findings that he would benefit from admission for further work-up.  Patient is in agreement with this plan.  I spoke with the hospitalist who is in agreement with this plan.        Amount and/or Complexity of Data Reviewed  Labs: ordered. Decision-making details documented in ED Course.  Radiology: ordered and independent interpretation performed. Decision-making details documented in ED Course.  ECG/medicine tests: ordered and independent interpretation performed. Decision-making details documented in ED Course.    Risk  Prescription drug management.  Decision regarding hospitalization.        Procedure  Procedures     PASCALE Dawn  10/24/23 2227       PASCALE Dawn  10/25/23 0101

## 2023-10-25 NOTE — DISCHARGE SUMMARY
Discharge Diagnosis  # Palpitations  # Elevated troponin  # Hypokalemia  # HTN  # HLD  # CAD  # Aortic stenosis, aortic root dilation s/p surgical repair  # DM2    Issues Requiring Follow-Up  Follow-up with Cardiology and PCP regarding palpitations.    Discharge Meds     Your medication list        CONTINUE taking these medications        Instructions Last Dose Given Next Dose Due   albuterol 90 mcg/actuation inhaler           amiodarone 200 mg tablet  Commonly known as: Pacerone           amLODIPine 5 mg tablet  Commonly known as: Norvasc           atorvastatin 80 mg tablet  Commonly known as: Lipitor           hydroCHLOROthiazide 25 mg tablet  Commonly known as: HYDRODiuril           metFORMIN 500 mg tablet  Commonly known as: Glucophage           metoprolol tartrate 50 mg tablet  Commonly known as: Lopressor           pantoprazole 40 mg EC tablet  Commonly known as: ProtoNix                    Test Results Pending At Discharge  Pending Labs       No current pending labs.            Hospital Course   Jules Neves is a 63 y.o. male PMHx HTN, HLD, CAD, aortic stenosis with dilated aortic root s/p AVR/root replacement/left atrial appendage ligation end of August 2023, DM2 who presented to [facility] ED on 10/25/23 with palpitations. He described that he was sitting down watching TV and started to feel palpitations going towards the left side of his neck. He describes the episode as lasting from around 5pm to 9pm 10/24. He mentions that by the time he presented to the ED his symptoms had resolved. In the ED, vitals were stable, saturating 95% on RA. Labs significant for trop 80 -> 78. CTA showed no PE. Patient was admitted for further evaluation of palpitations. While admitted, Cardiology was consulted and recommended that patient be discharged with outpatient Echo. Patient also advised to wear Cardiac Event Monitor and to follow-up with Cardiology and his PCP outpatient. No changes to home medications were made  during this admission.     Pertinent Physical Exam At Time of Discharge  Physical Exam  GEN: conversant, NAD  HEENT: PERRL, EOMI, MMM OP clear, TMs clear bilaterally  NECK: supple, no cervical LAD, no carotid bruits  CV: S1, S2, regular, no murmur  PULM: CTAB  ABD: soft, NT, ND, BS+  EXT: no LE edema  NEURO: no gross focal deficits  PSYCH: appropriate affect       Outpatient Follow-Up  Future Appointments   Date Time Provider Department Center   2/27/2024  8:00 AM [facility] ECHO 2 [id number] [facility]   3/27/2024  9:15 AM Nemesio Sparks MD [id number] [facility]         Cali Park MD

## 2023-10-25 NOTE — H&P
History Of Present Illness  Jules Neves is a 63 y.o. male PMHx HTN, HLD, CAD, aortic stenosis with dilated aortic root s/p AVR/root replacement/left atrial appendage ligation end of August 2023, DM2 presents to [facility] ED for palpitations.  He was sitting down watching TV and started to feel palpitations going into his neck.  This lasted for about 4 hours.  Denies any chest pressure, chest pain, shortness of breath, lightheadedness or dizziness.  By the time he got to the ED, his symptoms resolved.  This has never happened to him before.     Past Medical History  Past Medical History:   Diagnosis Date    Allergy status to unspecified drugs, medicaments and biological substances 11/18/2015    History of adverse drug reaction    Cardiac murmur, unspecified 11/13/2014    Systolic murmur    Encounter for screening for malignant neoplasm of colon 09/01/2016    Screening for colon cancer    Influenza due to other identified influenza virus with other respiratory manifestations 02/21/2018    Influenza A    Nonrheumatic aortic (valve) insufficiency 02/21/2018    Mild aortic insufficiency    Personal history of other diseases of the circulatory system 06/15/2018    History of aortic valve stenosis    Personal history of other diseases of the nervous system and sense organs 08/28/2014    History of labyrinthitis    Personal history of other diseases of the respiratory system 06/08/2015    History of sinusitis    Personal history of other diseases of the respiratory system     History of acute bronchitis    Personal history of other specified conditions 02/19/2018    History of edema    Personal history of other specified conditions 07/11/2016    History of vertigo    Type 2 diabetes mellitus with other specified complication (CMS/[facility]) 12/15/2022    Diabetes mellitus type 2 in obese       Surgical History  Past Surgical History:   Procedure Laterality Date    ANKLE SURGERY  01/28/2018    Ankle Surgery        Social  History  He has no history on file for tobacco use, alcohol use, and drug use.    Family History  No family history on file.     Allergies  Patient has no known allergies.    Review of Systems   Constitutional:  Negative for appetite change, chills and fever.   HENT:  Negative for congestion, ear pain, facial swelling, sinus pressure and sore throat.    Eyes:  Negative for pain, redness and visual disturbance.   Respiratory:  Negative for cough, shortness of breath and wheezing.    Cardiovascular:  Negative for chest pain, palpitations and leg swelling.   Gastrointestinal:  Negative for abdominal pain, blood in stool, constipation, diarrhea, nausea and vomiting.   Endocrine: Negative for polydipsia and polyuria.   Genitourinary:  Negative for difficulty urinating, dysuria, flank pain, frequency and hematuria.   Musculoskeletal:  Negative for back pain, joint swelling and neck pain.   Skin:  Negative for rash and wound.   Neurological:  Negative for dizziness, seizures, syncope, light-headedness and headaches.   Psychiatric/Behavioral:  Negative for confusion, hallucinations and sleep disturbance.    All other systems reviewed and are negative.       Physical Exam  Vitals and nursing note reviewed.   Constitutional:       General: He is not in acute distress.     Appearance: Normal appearance.   HENT:      Head: Normocephalic and atraumatic.      Right Ear: External ear normal.      Left Ear: External ear normal.      Mouth/Throat:      Mouth: Mucous membranes are moist.      Pharynx: Oropharynx is clear.   Eyes:      General: No scleral icterus.     Extraocular Movements: Extraocular movements intact.      Conjunctiva/sclera: Conjunctivae normal.      Pupils: Pupils are equal, round, and reactive to light.   Cardiovascular:      Rate and Rhythm: Normal rate and regular rhythm.      Pulses: Normal pulses.      Heart sounds: No murmur heard.  Pulmonary:      Effort: Pulmonary effort is normal. No respiratory distress.  "     Breath sounds: No wheezing, rhonchi or rales.   Abdominal:      General: Bowel sounds are normal. There is no distension.      Palpations: Abdomen is soft.      Tenderness: There is no abdominal tenderness. There is no guarding or rebound.   Musculoskeletal:         General: No swelling, tenderness or deformity.      Cervical back: Neck supple. No tenderness.   Skin:     General: Skin is warm and dry.      Coloration: Skin is not jaundiced.      Findings: No erythema.   Neurological:      General: No focal deficit present.      Mental Status: He is alert and oriented to person, place, and time.   Psychiatric:         Mood and Affect: Mood normal.         Behavior: Behavior normal.          Last Recorded Vitals  Blood pressure 140/73, pulse 67, temperature 36.9 °C (98.4 °F), resp. rate 15, height 1.905 m (6' 3\"), weight 132 kg (290 lb), SpO2 94 %.    Relevant Results        Scheduled medications    Continuous medications    PRN medications      Assessment/Plan   Principal Problem:    Palpitations      # Palpitations  # Elevated troponin  # Hypokalemia  # HTN  # HLD  # CAD  # Aortic stenosis, aortic root dilation s/p surgical repair  # DM2    He has been in NSR since arrival to hospital.  Monitor on telemetry, replace magnesium.  He had echo 9/5/23 with EF 65-70%, postop changes, mod concentric LVH.  Consult cardiology for additional recommendations.  Troponin mildly elevated, delta is downtrending.  He is not having any chest symptoms.  Will recommend daily aspirin until more cardio recs.  Anticipate he will need an event monitor at discharge.  Diabetic diet, ISS.  DVT prophylaxis.          I spent 50 minutes in the professional and overall care of this patient.      Rickey Bethae, DO    "

## 2023-10-25 NOTE — PROGRESS NOTES
Care transitions assessment completed via bedside with patient. TCC introduced self and explained role. Demographics verified. Patient from home alone.  Independent PTA. Uses cane, walker. Denies SW needs at this time, requesting SW from [facility] be added to active agency. Patient states [facility] SN and PT, unsure of agency name.  Patient states in need of DM supplies, [facility] was working on getting scripts from PCP. Only contact # patient had for [facility] was CRISTHIAN Urias at [phone]. TCC called # and not in service. Patient recently at [facility] and [facility] arranged at facility when discharged home. Call placed to [facility] for [facility] agency name and VM left, awaiting call back. Dispo pending hospital course. Patient drove self to hospital, but patient's family to transport home at time of discharge if patient is unable to drive self home. TCC to continue to follow for discharge planning needs.      PCP: Prieto Orozco Last seen: 9/26/23    Addendum:     1233: [facility] SW called this TCC back. Patient active with [facility]. Referral sent via [facility] for confirmation, awaiting response.     1353: [facility] able to resume care at discharge. Requested SW be added to referral for financial forms at home. Awaiting response.     1801: Patient being discharged. [facility] updated.        10/25/23 1110   Discharge Planning   Living Arrangements Alone   Support Systems Family members   Assistance Needed Independent, uses cane, walker   Type of Residence Private residence   Number of Stairs to Enter Residence 2   Number of Stairs Within Residence 9  (w/out railing)   Do you have animals or pets at home? No   Home or Post Acute Services In home services   Type of Home Care Services Home nursing visits;Home PT;Other (Comment)  (SW)   Patient expects to be discharged to: Home, with resumption of HHC   Does the patient need discharge transport arranged? No   Financial Resource Strain   How hard is it for you to pay for the very  basics like food, housing, medical care, and heating? Not hard   Housing Stability   In the last 12 months, was there a time when you were not able to pay the mortgage or rent on time? N   In the last 12 months, how many places have you lived? 1   In the last 12 months, was there a time when you did not have a steady place to sleep or slept in a shelter (including now)? N   Transportation Needs   In the past 12 months, has lack of transportation kept you from medical appointments or from getting medications? no   In the past 12 months, has lack of transportation kept you from meetings, work, or from getting things needed for daily living? No   Patient Choice   Provider Choice list and CMS website (https://medicare.gov/care-compare#search) for post-acute Quality and Resource Measure Data were provided and reviewed with: Patient   Patient / Family choosing to utilize agency / facility established prior to hospitalization Yes          hSarifa Tristan RN ED TCC

## 2023-10-26 ENCOUNTER — PATIENT OUTREACH (OUTPATIENT)
Dept: PRIMARY CARE | Facility: CLINIC | Age: 63
End: 2023-10-26
Payer: COMMERCIAL

## 2023-10-26 NOTE — PROGRESS NOTES
Discharge Facility: [facility]  Discharge Diagnosis: Palpitations, Elevated troponin, Hypokalemia, HTN, HLD, CAD, Aortic stenosis, aortic root dilation s/p surgical repair, DM2  Admission Date: 10/24/23  Discharge Date: 10/25/23    PCP Appointment Date: TBD- task to office  Specialist Appointment Date:   - Cardiology (echo, cardiac event monitor): TBD  Hospital Encounter and Summary: Linked   See discharge assessment below for further details    Medications  Medications reviewed with patient/caregiver?: Yes (see med list. no changes.) (10/26/2023 10:23 AM)  Is the patient having any side effects they believe may be caused by any medication additions or changes?: No (10/26/2023 10:23 AM)  Does the patient have all medications ordered at discharge?: Yes (10/26/2023 10:23 AM)  Care Management Interventions: No intervention needed (10/26/2023 10:23 AM)  Is the patient taking all medications as directed (includes completed medication regime)?: Yes (10/26/2023 10:23 AM)  Care Management Interventions: Provided patient education (10/26/2023 10:23 AM)    Appointments  Does the patient have a primary care provider?: Yes (10/26/2023 10:23 AM)  Care Management Interventions: Advised patient to make appointment (10/26/2023 10:23 AM)  Care Management Interventions: Advised patient to keep appointment (10/26/2023 10:23 AM)    Self Management  What is the home health agency?: [facility]- resume (10/26/2023 10:23 AM)  Has home health visited the patient within 72 hours of discharge?: Unsure (10/26/2023 10:23 AM)  What Durable Medical Equipment (DME) was ordered?: n/a (10/26/2023 10:23 AM)    Patient Teaching  Does the patient have access to their discharge instructions?: Yes (10/26/2023 10:23 AM)  Care Management Interventions: Reviewed instructions with patient (10/26/2023 10:23 AM)  What is the patient's perception of their health status since discharge?: Improving (10/26/2023 10:23 AM)  Is the patient/caregiver  able to teach back the hierarchy of who to call/visit for symptoms/problems? PCP, Specialist, Home Health nurse, Urgent Care, ED, 911: Yes (10/26/2023 10:23 AM)

## 2023-10-27 PROBLEM — N40.0 ENLARGED PROSTATE WITHOUT LOWER URINARY TRACT SYMPTOMS (LUTS): Status: ACTIVE | Noted: 2023-10-27

## 2023-10-27 PROBLEM — Q23.1 AORTIC REGURGITATION DUE TO BICUSPID AORTIC VALVE (HHS-HCC): Status: ACTIVE | Noted: 2023-10-27

## 2023-10-27 PROBLEM — E66.01 CLASS 3 SEVERE OBESITY WITH SERIOUS COMORBIDITY AND BODY MASS INDEX (BMI) OF 40.0 TO 44.9 IN ADULT (MULTI): Status: ACTIVE | Noted: 2023-10-27

## 2023-10-27 PROBLEM — E66.813 CLASS 3 SEVERE OBESITY WITH SERIOUS COMORBIDITY AND BODY MASS INDEX (BMI) OF 40.0 TO 44.9 IN ADULT: Status: ACTIVE | Noted: 2023-10-27

## 2023-10-27 PROBLEM — E11.69 DIABETES MELLITUS TYPE 2 IN OBESE: Status: ACTIVE | Noted: 2023-10-27

## 2023-10-27 PROBLEM — I71.21 ASCENDING AORTIC ANEURYSM (CMS-HCC): Status: ACTIVE | Noted: 2023-10-27

## 2023-10-27 PROBLEM — I51.9 HEART DISEASE: Status: ACTIVE | Noted: 2022-05-12

## 2023-10-27 PROBLEM — I35.0 MODERATE AORTIC STENOSIS: Status: ACTIVE | Noted: 2023-10-27

## 2023-10-27 PROBLEM — N20.0 KIDNEY STONE: Status: ACTIVE | Noted: 2022-05-12

## 2023-10-27 PROBLEM — G47.33 OSA (OBSTRUCTIVE SLEEP APNEA): Status: ACTIVE | Noted: 2023-10-27

## 2023-10-27 PROBLEM — I48.91 A-FIB (MULTI): Status: ACTIVE | Noted: 2023-10-27

## 2023-10-27 PROBLEM — M19.90 OA (OSTEOARTHRITIS): Status: ACTIVE | Noted: 2023-10-27

## 2023-10-27 PROBLEM — K64.5 HEMORRHOIDS, THROMBOSED: Status: ACTIVE | Noted: 2023-10-27

## 2023-10-27 PROBLEM — I35.1 MODERATE AORTIC INSUFFICIENCY: Status: ACTIVE | Noted: 2023-10-27

## 2023-10-27 PROBLEM — M12.572 TRAUMATIC ARTHRITIS OF LEFT ANKLE: Status: ACTIVE | Noted: 2018-09-26

## 2023-10-27 PROBLEM — Q23.81 AORTIC REGURGITATION DUE TO BICUSPID AORTIC VALVE: Status: ACTIVE | Noted: 2023-10-27

## 2023-10-27 PROBLEM — E78.5 HYPERLIPIDEMIA: Status: ACTIVE | Noted: 2023-10-27

## 2023-10-27 PROBLEM — Z95.828 S/P ASCENDING AORTIC REPLACEMENT: Status: ACTIVE | Noted: 2023-10-27

## 2023-10-27 PROBLEM — I25.10 CORONARY ARTERY DISEASE: Status: ACTIVE | Noted: 2023-10-27

## 2023-10-27 PROBLEM — E66.9 DIABETES MELLITUS TYPE 2 IN OBESE: Status: ACTIVE | Noted: 2023-10-27

## 2023-10-27 PROBLEM — I49.3 FREQUENT PVCS: Status: ACTIVE | Noted: 2023-10-27

## 2023-10-27 PROBLEM — I35.0 AORTIC VALVE STENOSIS: Status: ACTIVE | Noted: 2023-10-27

## 2023-10-27 PROBLEM — G47.30 OBSERVED SLEEP APNEA: Status: ACTIVE | Noted: 2023-10-27

## 2023-10-27 PROBLEM — E66.01 MORBID OBESITY (MULTI): Status: ACTIVE | Noted: 2023-10-27

## 2023-10-27 PROBLEM — L57.0 KERATOSIS, ACTINIC: Status: ACTIVE | Noted: 2023-10-27

## 2023-10-27 RX ORDER — MULTIVIT-MIN/IRON FUM/FOLIC AC 7.5 MG-4
1 TABLET ORAL DAILY
COMMUNITY
End: 2023-12-19 | Stop reason: ALTCHOICE

## 2023-10-27 RX ORDER — ACETAMINOPHEN 325 MG/1
TABLET ORAL
COMMUNITY

## 2023-10-27 RX ORDER — IRON POLYSACCHARIDE COMPLEX 150 MG
150 CAPSULE ORAL DAILY
COMMUNITY
End: 2023-12-19 | Stop reason: ALTCHOICE

## 2023-10-30 ENCOUNTER — HOSPITAL ENCOUNTER (OUTPATIENT)
Dept: CARDIOLOGY | Facility: CLINIC | Age: 63
Discharge: HOME | End: 2023-10-30
Payer: COMMERCIAL

## 2023-10-30 DIAGNOSIS — I97.89 PERICARDIAL EFFUSION AFTER OPERATIVE PROCEDURE (HHS-HCC): ICD-10-CM

## 2023-10-30 DIAGNOSIS — I31.39 PERICARDIAL EFFUSION AFTER OPERATIVE PROCEDURE (HHS-HCC): ICD-10-CM

## 2023-10-30 DIAGNOSIS — R00.2 PALPITATIONS: ICD-10-CM

## 2023-10-30 PROCEDURE — 93246 EXT ECG>7D<15D RECORDING: CPT

## 2023-10-30 PROCEDURE — 93246 EXT ECG>7D<15D RECORDING: CPT | Performed by: INTERNAL MEDICINE

## 2023-10-31 ENCOUNTER — HOSPITAL ENCOUNTER (OUTPATIENT)
Dept: CARDIOLOGY | Facility: CLINIC | Age: 63
Discharge: HOME | End: 2023-10-31
Payer: COMMERCIAL

## 2023-10-31 ENCOUNTER — TELEPHONE (OUTPATIENT)
Dept: PRIMARY CARE | Facility: CLINIC | Age: 63
End: 2023-10-31

## 2023-10-31 VITALS
SYSTOLIC BLOOD PRESSURE: 146 MMHG | BODY MASS INDEX: 36.06 KG/M2 | HEIGHT: 75 IN | WEIGHT: 290 LBS | DIASTOLIC BLOOD PRESSURE: 70 MMHG

## 2023-10-31 DIAGNOSIS — R00.2 PALPITATIONS: ICD-10-CM

## 2023-10-31 DIAGNOSIS — E11.69 TYPE 2 DIABETES MELLITUS WITH OTHER SPECIFIED COMPLICATION, WITHOUT LONG-TERM CURRENT USE OF INSULIN (MULTI): ICD-10-CM

## 2023-10-31 DIAGNOSIS — I97.89 PERICARDIAL EFFUSION AFTER OPERATIVE PROCEDURE (HHS-HCC): ICD-10-CM

## 2023-10-31 DIAGNOSIS — I10 HYPERTENSION, UNSPECIFIED TYPE: Primary | ICD-10-CM

## 2023-10-31 DIAGNOSIS — I31.39 PERICARDIAL EFFUSION AFTER OPERATIVE PROCEDURE (HHS-HCC): ICD-10-CM

## 2023-10-31 LAB — EJECTION FRACTION APICAL 4 CHAMBER: 65.2

## 2023-10-31 PROCEDURE — 93306 TTE W/DOPPLER COMPLETE: CPT

## 2023-10-31 PROCEDURE — 93306 TTE W/DOPPLER COMPLETE: CPT | Performed by: INTERNAL MEDICINE

## 2023-10-31 RX ORDER — HYDROCHLOROTHIAZIDE 25 MG/1
TABLET ORAL
Qty: 90 TABLET | Refills: 1 | Status: SHIPPED | OUTPATIENT
Start: 2023-10-31 | End: 2024-04-29

## 2023-10-31 RX ORDER — ATORVASTATIN CALCIUM 80 MG/1
80 TABLET, FILM COATED ORAL DAILY
Qty: 90 TABLET | Refills: 1 | Status: SHIPPED | OUTPATIENT
Start: 2023-10-31 | End: 2024-02-02 | Stop reason: SDUPTHER

## 2023-10-31 RX ORDER — AMLODIPINE BESYLATE 5 MG/1
5 TABLET ORAL DAILY
Qty: 90 TABLET | Refills: 1 | Status: SHIPPED | OUTPATIENT
Start: 2023-10-31 | End: 2024-04-29

## 2023-10-31 NOTE — TELEPHONE ENCOUNTER
Pt also needs Misc Test strips and lancets to start testing his BS    Name:  Jules Neves  :  [id number]  Medication Name:  Amiodarone  Dose : 200 Mg  Route : Tablet  Frequency : 1 per day  Quantity : 30 tablets  Directions : Take one tablet by mouth one time a day for health maintenance  Specific Pharmacy location:  [facility], on file  Date of last appointment:  23  Date of next appointment:  23    Name:  Jules Neves  :  [id number]  Medication Name:  Metformin  Dose : 500 Mg  Route : Tablet  Frequency : 2 per day  Quantity : 180 tablets  Directions : Take one tablet by mouth two times a day with meals  Specific Pharmacy location:  [facility], on file  Date of last appointment:  23  Date of next appointment:  23    Medication Name:  Metoprolol tartrate  Dose : 50 Mg  Route : Tablet  Frequency : 2 per day  Quantity : 180 tablets  Directions : Take one tablet by mouth every 12 hours  Specific Pharmacy location:  [facility], on file  Date of last appointment:  23  Date of next appointment:  23    Medication Name:  Pantoprazole  Dose : 40 Mg  Route : Tablet  Frequency : 1 per day  Quantity :  30 tablets  Directions : Take one tablet by mouth daily  Specific Pharmacy location:  [facility], on file  Date of last appointment:  23  Date of next appointment:  23

## 2023-11-02 ENCOUNTER — OFFICE VISIT (OUTPATIENT)
Dept: PRIMARY CARE | Facility: CLINIC | Age: 63
End: 2023-11-02
Payer: COMMERCIAL

## 2023-11-02 VITALS
WEIGHT: 301 LBS | HEART RATE: 75 BPM | HEIGHT: 75 IN | SYSTOLIC BLOOD PRESSURE: 145 MMHG | DIASTOLIC BLOOD PRESSURE: 91 MMHG | OXYGEN SATURATION: 95 % | BODY MASS INDEX: 37.42 KG/M2

## 2023-11-02 DIAGNOSIS — I10 HYPERTENSION, UNSPECIFIED TYPE: Primary | ICD-10-CM

## 2023-11-02 DIAGNOSIS — E11.69 TYPE 2 DIABETES MELLITUS WITH OTHER SPECIFIED COMPLICATION, WITHOUT LONG-TERM CURRENT USE OF INSULIN (MULTI): ICD-10-CM

## 2023-11-02 PROCEDURE — 3044F HG A1C LEVEL LT 7.0%: CPT | Performed by: FAMILY MEDICINE

## 2023-11-02 PROCEDURE — 99214 OFFICE O/P EST MOD 30 MIN: CPT | Performed by: FAMILY MEDICINE

## 2023-11-02 PROCEDURE — 3008F BODY MASS INDEX DOCD: CPT | Performed by: FAMILY MEDICINE

## 2023-11-02 PROCEDURE — 3077F SYST BP >= 140 MM HG: CPT | Performed by: FAMILY MEDICINE

## 2023-11-02 PROCEDURE — 4010F ACE/ARB THERAPY RXD/TAKEN: CPT | Performed by: FAMILY MEDICINE

## 2023-11-02 PROCEDURE — 3080F DIAST BP >= 90 MM HG: CPT | Performed by: FAMILY MEDICINE

## 2023-11-02 RX ORDER — PANTOPRAZOLE SODIUM 40 MG/1
40 TABLET, DELAYED RELEASE ORAL
Qty: 90 TABLET | Refills: 1 | Status: SHIPPED | OUTPATIENT
Start: 2023-11-02 | End: 2024-04-29

## 2023-11-02 RX ORDER — LOSARTAN POTASSIUM 25 MG/1
25 TABLET ORAL DAILY
Qty: 90 TABLET | Refills: 1 | Status: SHIPPED | OUTPATIENT
Start: 2023-11-02 | End: 2024-04-29

## 2023-11-02 RX ORDER — AMIODARONE HYDROCHLORIDE 200 MG/1
TABLET ORAL
Qty: 90 TABLET | Refills: 1 | Status: SHIPPED | OUTPATIENT
Start: 2023-11-02 | End: 2024-04-29

## 2023-11-02 RX ORDER — INSULIN PUMP SYRINGE, 3 ML
EACH MISCELLANEOUS
Qty: 1 EACH | Refills: 0 | Status: SHIPPED | OUTPATIENT
Start: 2023-11-02 | End: 2024-11-01

## 2023-11-02 ASSESSMENT — ENCOUNTER SYMPTOMS
MUSCULOSKELETAL NEGATIVE: 1
RESPIRATORY NEGATIVE: 1
GASTROINTESTINAL NEGATIVE: 1
CONSTITUTIONAL NEGATIVE: 1
PALPITATIONS: 1

## 2023-11-02 NOTE — TELEPHONE ENCOUNTER
Name:  Jules HAINES Rohitromieskfiorella  :  [id number]  Medication Name:  Metformin  Dose : 500 Mg  Route : Tablet  Frequency : 2 per day  Quantity : 180 tablets  Directions : Take one tablet by mouth two times a day with meals  Specific Pharmacy location:  [facility], on file  Date of last appointment:  23  Date of next appointment:  23     Medication Name:  Metoprolol tartrate  Dose : 50 Mg  Route : Tablet  Frequency : 2 per day  Quantity : 180 tablets  Directions : Take one tablet by mouth every 12 hours  Specific Pharmacy location:  [facility], on file  Date of last appointment:  23  Date of next appointment:  23

## 2023-11-02 NOTE — PROGRESS NOTES
Subjective   Patient ID: Jules Neves is a 63 y.o. male who presents for Hospital Follow-up and Medication Question.  HPI  Recent palp needs fu with cardiology will refill meds get glucometer patient had some arrhythmias was seen in the emergency department is can a follow-up with cardiology patient is a ran out of some of his medications and was not taking it.  Patient needs to get glucometer and start checking his blood sugars on a daily basis  Review of Systems   Constitutional: Negative.    HENT: Negative.     Respiratory: Negative.     Cardiovascular:  Positive for palpitations. Negative for chest pain and leg swelling.   Gastrointestinal: Negative.    Genitourinary: Negative.    Musculoskeletal: Negative.    Constitutional:       Appearance: Normal appearance.   HENT:      Head: Normocephalic.      Nose: Nose normal.      Mouth/Throat:      Mouth: Mucous membranes are moist.   Eyes:      Extraocular Movements: Extraocular movements intact.      Pupils: Pupils are equal, round, and reactive to light.   Cardiovascular:      Rate and Rhythm: Normal rate and regular rhythm.   Pulmonary:      Effort: Pulmonary effort is normal.   Musculoskeletal:         General: Normal range of motion.      Cervical back: Normal range of motion.   Skin:     General: Skin is warm and dry.   Neurological:      Mental Status: He is alert.           Objective   Physical Exam    Assessment/Plan   Diagnoses and all orders for this visit:  Hypertension, unspecified type  -     amiodarone (Pacerone) 200 mg tablet; take 200 mg (1 tablet) by mouth one time a day for health maintenance  -     pantoprazole (ProtoNix) 40 mg EC tablet; Take 1 tablet (40 mg) by mouth once daily in the morning. Take before meals.  -     losartan (Cozaar) 25 mg tablet; Take 1 tablet (25 mg) by mouth once daily.  Type 2 diabetes mellitus with other specified complication, without long-term current use of insulin (CMS/[facility])  -     FreeStyle glucose monitoring  kit; Check sugar daily

## 2023-11-03 ENCOUNTER — APPOINTMENT (OUTPATIENT)
Dept: CARDIOLOGY | Facility: CLINIC | Age: 63
End: 2023-11-03
Payer: COMMERCIAL

## 2023-11-05 RX ORDER — LANCETS
EACH MISCELLANEOUS
Qty: 100 EACH | Refills: 3 | Status: SHIPPED | OUTPATIENT
Start: 2023-11-05

## 2023-11-05 RX ORDER — BLOOD SUGAR DIAGNOSTIC
1 STRIP MISCELLANEOUS DAILY
Qty: 100 EACH | Refills: 3 | Status: SHIPPED | OUTPATIENT
Start: 2023-11-05

## 2023-11-09 RX ORDER — METFORMIN HYDROCHLORIDE 500 MG/1
500 TABLET ORAL
Qty: 180 TABLET | Refills: 0 | Status: SHIPPED | OUTPATIENT
Start: 2023-11-09 | End: 2024-02-22

## 2023-11-09 RX ORDER — METOPROLOL TARTRATE 50 MG/1
50 TABLET ORAL 2 TIMES DAILY
Qty: 180 TABLET | Refills: 0 | Status: SHIPPED | OUTPATIENT
Start: 2023-11-09 | End: 2024-05-06

## 2023-11-10 ENCOUNTER — PATIENT OUTREACH (OUTPATIENT)
Dept: PRIMARY CARE | Facility: CLINIC | Age: 63
End: 2023-11-10
Payer: COMMERCIAL

## 2023-11-10 NOTE — PROGRESS NOTES
Call regarding appt. with PCP on 11/2/23 after hospitalization.  At time of outreach call the patient feels as if their condition has been the same since last visit. States he has fallen twice and the home care nurse thinks his blood pressure may be dropping when he stands up. States he did not get hurt during the falls. States he contacted his cardiology office about this and he is on a wait list to be seen sooner. He is currently on a holter monitor and is just awaiting some answers.  Reviewed the PCP appointment with the pt and addressed any questions or concerns.

## 2023-11-27 ENCOUNTER — APPOINTMENT (OUTPATIENT)
Dept: RADIOLOGY | Facility: CLINIC | Age: 63
End: 2023-11-27
Payer: COMMERCIAL

## 2023-11-27 ENCOUNTER — HOSPITAL ENCOUNTER (OUTPATIENT)
Dept: RADIOLOGY | Facility: HOSPITAL | Age: 63
Discharge: HOME | End: 2023-11-27
Payer: COMMERCIAL

## 2023-11-27 DIAGNOSIS — I35.0 NONRHEUMATIC AORTIC (VALVE) STENOSIS: ICD-10-CM

## 2023-11-27 DIAGNOSIS — I71.21 ANEURYSM OF THE ASCENDING AORTA, WITHOUT RUPTURE (CMS-HCC): ICD-10-CM

## 2023-11-27 PROCEDURE — 2550000001 HC RX 255 CONTRASTS: Performed by: INTERNAL MEDICINE

## 2023-11-27 PROCEDURE — 71275 CT ANGIOGRAPHY CHEST: CPT | Performed by: RADIOLOGY

## 2023-11-27 PROCEDURE — 71260 CT THORAX DX C+: CPT

## 2023-11-27 PROCEDURE — 71046 X-RAY EXAM CHEST 2 VIEWS: CPT | Performed by: RADIOLOGY

## 2023-11-27 PROCEDURE — 71275 CT ANGIOGRAPHY CHEST: CPT

## 2023-11-27 PROCEDURE — 71046 X-RAY EXAM CHEST 2 VIEWS: CPT | Mod: FY

## 2023-11-27 RX ADMIN — IOHEXOL 100 ML: 350 INJECTION, SOLUTION INTRAVENOUS at 11:49

## 2023-12-05 ENCOUNTER — HOSPITAL ENCOUNTER (OUTPATIENT)
Dept: CARDIOLOGY | Facility: HOSPITAL | Age: 63
Discharge: HOME | End: 2023-12-05
Payer: COMMERCIAL

## 2023-12-05 ENCOUNTER — APPOINTMENT (OUTPATIENT)
Dept: CARDIAC REHAB | Facility: HOSPITAL | Age: 63
End: 2023-12-05
Payer: COMMERCIAL

## 2023-12-05 DIAGNOSIS — Z95.2 STATUS POST AORTIC VALVE REPLACEMENT: ICD-10-CM

## 2023-12-05 DIAGNOSIS — Z95.2 STATUS POST AORTIC VALVE REPLACEMENT: Primary | ICD-10-CM

## 2023-12-05 LAB
AORTIC VALVE MEAN GRADIENT: 23
AORTIC VALVE PEAK VELOCITY: 2.84
AV PEAK GRADIENT: 32.3
LEFT VENTRICULAR OUTFLOW TRACT DIAMETER: 2.2
MITRAL VALVE E/A RATIO: 0.8
MITRAL VALVE E/E' RATIO: 9.87
RIGHT VENTRICLE FREE WALL PEAK S': 7.83

## 2023-12-05 PROCEDURE — 2500000004 HC RX 250 GENERAL PHARMACY W/ HCPCS (ALT 636 FOR OP/ED): Performed by: THORACIC SURGERY (CARDIOTHORACIC VASCULAR SURGERY)

## 2023-12-05 PROCEDURE — 93306 TTE W/DOPPLER COMPLETE: CPT | Performed by: INTERNAL MEDICINE

## 2023-12-05 PROCEDURE — 93306 TTE W/DOPPLER COMPLETE: CPT

## 2023-12-05 RX ADMIN — PERFLUTREN 1 ML OF DILUTION: 6.52 INJECTION, SUSPENSION INTRAVENOUS at 10:03

## 2023-12-08 ENCOUNTER — PATIENT OUTREACH (OUTPATIENT)
Dept: PRIMARY CARE | Facility: CLINIC | Age: 63
End: 2023-12-08

## 2023-12-08 ENCOUNTER — APPOINTMENT (OUTPATIENT)
Dept: CARDIAC REHAB | Facility: HOSPITAL | Age: 63
End: 2023-12-08
Payer: COMMERCIAL

## 2023-12-08 ENCOUNTER — CLINICAL SUPPORT (OUTPATIENT)
Dept: CARDIAC REHAB | Facility: HOSPITAL | Age: 63
End: 2023-12-08
Payer: COMMERCIAL

## 2023-12-08 DIAGNOSIS — Z95.2 HEART VALVE REPLACED: ICD-10-CM

## 2023-12-08 DIAGNOSIS — Z95.1 PRESENCE OF AORTOCORONARY BYPASS GRAFT: ICD-10-CM

## 2023-12-08 NOTE — PROGRESS NOTES
Call regarding recent hospitalization.  At time of outreach call the patient feels as if their condition has improved since last visit. Patient states he is doing okay.  Reviewed the PCP appointment with the pt and addressed any questions or concerns.

## 2023-12-15 ENCOUNTER — CLINICAL SUPPORT (OUTPATIENT)
Dept: CARDIAC REHAB | Facility: HOSPITAL | Age: 63
End: 2023-12-15
Payer: COMMERCIAL

## 2023-12-15 DIAGNOSIS — Z95.2 HEART VALVE REPLACED: ICD-10-CM

## 2023-12-15 PROCEDURE — 93798 PHYS/QHP OP CAR RHAB W/ECG: CPT | Performed by: INTERNAL MEDICINE

## 2023-12-18 ENCOUNTER — CLINICAL SUPPORT (OUTPATIENT)
Dept: CARDIAC REHAB | Facility: HOSPITAL | Age: 63
End: 2023-12-18
Payer: COMMERCIAL

## 2023-12-18 DIAGNOSIS — Z95.2 HEART VALVE REPLACED: ICD-10-CM

## 2023-12-18 PROCEDURE — 93798 PHYS/QHP OP CAR RHAB W/ECG: CPT | Performed by: INTERNAL MEDICINE

## 2023-12-19 ENCOUNTER — OFFICE VISIT (OUTPATIENT)
Dept: CARDIOLOGY | Facility: CLINIC | Age: 63
End: 2023-12-19
Payer: COMMERCIAL

## 2023-12-19 DIAGNOSIS — G45.9 TIA (TRANSIENT ISCHEMIC ATTACK): ICD-10-CM

## 2023-12-19 DIAGNOSIS — R00.2 PALPITATIONS: ICD-10-CM

## 2023-12-19 DIAGNOSIS — Z95.828 S/P ASCENDING AORTIC REPLACEMENT: Primary | ICD-10-CM

## 2023-12-19 DIAGNOSIS — Z95.2 HEART VALVE REPLACED: ICD-10-CM

## 2023-12-19 DIAGNOSIS — I10 PRIMARY HYPERTENSION: ICD-10-CM

## 2023-12-19 DIAGNOSIS — I49.3 FREQUENT PVCS: ICD-10-CM

## 2023-12-19 DIAGNOSIS — I25.10 CORONARY ARTERY DISEASE INVOLVING NATIVE CORONARY ARTERY OF NATIVE HEART WITHOUT ANGINA PECTORIS: ICD-10-CM

## 2023-12-19 DIAGNOSIS — I10 BENIGN ESSENTIAL HYPERTENSION: ICD-10-CM

## 2023-12-19 DIAGNOSIS — I35.0 NONRHEUMATIC AORTIC VALVE STENOSIS: ICD-10-CM

## 2023-12-19 DIAGNOSIS — Q23.1 AORTIC REGURGITATION DUE TO BICUSPID AORTIC VALVE (HHS-HCC): ICD-10-CM

## 2023-12-19 DIAGNOSIS — E78.49 OTHER HYPERLIPIDEMIA: ICD-10-CM

## 2023-12-19 PROBLEM — I48.91 A-FIB (MULTI): Status: RESOLVED | Noted: 2023-10-27 | Resolved: 2023-12-19

## 2023-12-19 LAB
ATRIAL RATE: 78 BPM
P AXIS: 26 DEGREES
P OFFSET: 146 MS
P ONSET: 89 MS
PR INTERVAL: 246 MS
Q ONSET: 212 MS
QRS COUNT: 13 BEATS
QRS DURATION: 88 MS
QT INTERVAL: 480 MS
QTC CALCULATION(BAZETT): 547 MS
QTC FREDERICIA: 524 MS
R AXIS: -34 DEGREES
T AXIS: 100 DEGREES
T OFFSET: 452 MS
VENTRICULAR RATE: 78 BPM

## 2023-12-19 PROCEDURE — 3079F DIAST BP 80-89 MM HG: CPT | Performed by: INTERNAL MEDICINE

## 2023-12-19 PROCEDURE — 3075F SYST BP GE 130 - 139MM HG: CPT | Performed by: INTERNAL MEDICINE

## 2023-12-19 PROCEDURE — 3044F HG A1C LEVEL LT 7.0%: CPT | Performed by: INTERNAL MEDICINE

## 2023-12-19 PROCEDURE — 1036F TOBACCO NON-USER: CPT | Performed by: INTERNAL MEDICINE

## 2023-12-19 PROCEDURE — 99214 OFFICE O/P EST MOD 30 MIN: CPT | Performed by: INTERNAL MEDICINE

## 2023-12-19 PROCEDURE — 3008F BODY MASS INDEX DOCD: CPT | Performed by: INTERNAL MEDICINE

## 2023-12-19 PROCEDURE — 4010F ACE/ARB THERAPY RXD/TAKEN: CPT | Performed by: INTERNAL MEDICINE

## 2023-12-19 RX ORDER — LANCETS 33 GAUGE
EACH MISCELLANEOUS
COMMUNITY
Start: 2023-11-06

## 2023-12-19 RX ORDER — BLOOD-GLUCOSE METER
EACH MISCELLANEOUS
COMMUNITY
Start: 2023-11-06

## 2023-12-19 NOTE — PROGRESS NOTES
Subjective   Jules Neves is a 63 y.o. male.    Chief Complaint:  Follow-up surgery for aortic valve replacement and ascending aortic aneurysm replacement.    HPI    In May 2023 he was referred to [facility].  His CT of the chest demonstrated a 4.7 cm ascending aortic aneurysm.  His echo showed a peak 32 mm gradient across aortic valve with a mean 23 mm gradient across the aortic valve left ventricular function was normal with a 65 to 70% ejection fraction.  He underwent surgery with aortic valve replacement and ascending aortic aneurysm surgery.  He is currently postop.  Continues to have incisional chest discomfort.  Shortness of breath and dyspnea with exertion.  His cardiac catheterization demonstrated minimal coronary disease.  He has degree of shortness of breath and dyspnea is unchanged from his preoperative status.  He did have an episode of weakness on the right side.  This lasted for about a few minutes.  He had difficulty walking with the episode of weakness.    His cardiac history is significant for the presence of a bicuspid aortic valve. He has aortic stenosis and aortic regurgitation. His past CT of the chest is demonstrated an ascending aortic aneurysm. He also has calcifications in the distribution of the coronary artery is also seen in documented on CT scanning.     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    Allergies  Medication    · No Known Drug Allergies   Recorded By: Galilea Manrique; 3/19/2014 10:11:51 AM     Family History  Mother    · No pertinent family history     Social History  Problems    · Does not use illicit drugs (V49.89) (Z78.9)   · Former smoker (V15.82) (Z87.891)   · used cigars once in awhile. Pt. stopped in early 1-1-2014   · No alcohol use   · Occasional caffeine consumption      Review of Systems   Constitutional: Positive for malaise/fatigue.   Cardiovascular:  Positive for dyspnea on exertion.  "  Respiratory:  Positive for shortness of breath and sleep disturbances due to breathing.    Musculoskeletal:  Positive for arthritis and joint pain.       Visit Vitals  /80 (BP Location: Right arm, Patient Position: Sitting, BP Cuff Size: Adult long)   Pulse 78   Ht 1.905 m (6' 3\")   Wt 138 kg (304 lb)   SpO2 96%   BMI 38.00 kg/m²   Smoking Status Never   BSA 2.7 m²        Objective     Constitutional:       Appearance: Not in distress.   Neck:      Vascular: JVD normal.   Pulmonary:      Breath sounds: Normal breath sounds.   Cardiovascular:      Normal rate. Regular rhythm. S1 with normal intensity. S2 with normal intensity.       Murmurs: There is a grade 2/6 systolic murmur.      No gallop.    Pulses:     Intact distal pulses.   Edema:     Peripheral edema absent.   Abdominal:      General: Bowel sounds are normal.   Neurological:      Mental Status: Alert and oriented to person, place and time.         Lab Review:   Lab Results   Component Value Date     10/25/2023    K 3.7 10/25/2023     10/25/2023    CO2 27 10/25/2023    BUN 8 10/25/2023    CREATININE 0.73 10/25/2023    GLUCOSE 96 10/25/2023    CALCIUM 9.0 10/25/2023     Lab Results   Component Value Date    CHOL 109 04/25/2023    TRIG 134 04/25/2023    HDL 28.7 (A) 04/25/2023       Assessment:    1.  Status post aortic valve replacement.  Follow-up echo studies have demonstrated a normally functioning bioprosthetic aortic valve.    2.  Status post ascending aortic aneurysm surgery.    3.  Hypertension.  Blood pressures are adequately controlled.    4.  Coronary artery disease.  Minimal coronary disease by cardiac catheterization.    5.  Hyperlipidemia.  Lipid profile showed a total cholesterol of 109.    6.  Transient ischemic attack.  Will get carotid ultrasound studies.  "

## 2023-12-19 NOTE — PATIENT INSTRUCTIONS
We are going to get a carotid ultrasound test.    A few days after the test call us for the results.

## 2023-12-20 ENCOUNTER — CLINICAL SUPPORT (OUTPATIENT)
Dept: CARDIAC REHAB | Facility: HOSPITAL | Age: 63
End: 2023-12-20
Payer: COMMERCIAL

## 2023-12-20 VITALS
WEIGHT: 304 LBS | DIASTOLIC BLOOD PRESSURE: 80 MMHG | SYSTOLIC BLOOD PRESSURE: 130 MMHG | HEIGHT: 75 IN | BODY MASS INDEX: 37.8 KG/M2 | OXYGEN SATURATION: 96 % | HEART RATE: 78 BPM

## 2023-12-20 DIAGNOSIS — Z95.2 HEART VALVE REPLACED: ICD-10-CM

## 2023-12-20 PROCEDURE — 93798 PHYS/QHP OP CAR RHAB W/ECG: CPT | Performed by: INTERNAL MEDICINE

## 2023-12-20 ASSESSMENT — ENCOUNTER SYMPTOMS
SHORTNESS OF BREATH: 1
SLEEP DISTURBANCES DUE TO BREATHING: 1
DYSPNEA ON EXERTION: 1

## 2023-12-20 NOTE — PROGRESS NOTES
Cardiac Rehabilitation Initial Assessment (iITP)    Name: Jules Neves  Medical Record Number: [id number]  YOB: 1960    Today’s Date: 12/20/2023  Primary Care Physician: Prieto Orozco DO  Referring Physician: ***    General  1. Heart valve replaced  Follow Up In Cardiac Rehab          AACVPR Risk Stratification:{AACVPR Risk Stratification:24867}    Medical History  Past Medical History:   Diagnosis Date    Allergy status to unspecified drugs, medicaments and biological substances 11/18/2015    History of adverse drug reaction    Cardiac murmur, unspecified 11/13/2014    Systolic murmur    Encounter for screening for malignant neoplasm of colon 09/01/2016    Screening for colon cancer    Influenza due to other identified influenza virus with other respiratory manifestations 02/21/2018    Influenza A    Nonrheumatic aortic (valve) insufficiency 02/21/2018    Mild aortic insufficiency    Personal history of other diseases of the circulatory system 06/15/2018    History of aortic valve stenosis    Personal history of other diseases of the nervous system and sense organs 08/28/2014    History of labyrinthitis    Personal history of other diseases of the respiratory system 06/08/2015    History of sinusitis    Personal history of other diseases of the respiratory system     History of acute bronchitis    Personal history of other specified conditions 02/19/2018    History of edema    Personal history of other specified conditions 07/11/2016    History of vertigo    Type 2 diabetes mellitus with other specified complication (CMS/HCC) 12/15/2022    Diabetes mellitus type 2 in obese     Past Surgical History:   Procedure Laterality Date    ANKLE SURGERY  01/28/2018    Ankle Surgery     Allergies: No Known Allergies    Psychosocial Assessment  Patient is returning for a follow up visit after reporting minimal to mild depressive symptoms.     Please document a new PHQ-9 score.    Pt reported/currently  experiencing: {Yes/No:91177}  Currently seeing a mental health provider: {Yes/No:86589}  Social Support: {Yes/No:62279}    Learning assessment/barriers: {Assessment/barriers:99847}  Preferred learning method: {Preferred learning method:72962}   Quality of Life Survey:{Quality of Life Survey:24523}    Educational Assessment:  Cardiac Knowledge Test: ***/15    Stages of Change:{Stages of change:30809}    Psychosocial Goals: ***  Psychosocial Interventions/Education: ***        Nutrition Assessment:    Hyperlipidemia: {Yes/No:09089} ***    Lipids: ***  Lipid Draw Date: ***    Current Dietary Guidelines:{Dietary Guidelines:03346}  Barriers to dietary change: {Yes/No:47471} ***    Diet Habit Survey score: ***    Diabetes Assessment    Diabetes:{Yes or No:82689}   Medication: {Medication:37157}  Last Hemoglobin A1C: ***   Last Hemoglobin A1C date: ***  Pt monitors BS at home: {Yes/No:96962}  Frequency: ***  FBS range: ***  Hypoglycemic Episodes: {Yes/No:63734}    Weight Management:    There were no vitals filed for this visit.  There is no height or weight on file to calculate BMI.    Nutrition Goals: ***  Nutrition Interventions/Education: ***    Exercise Assessment:  Current Home Exercise: {Yes/No:64093}  Mode: ***  Days/Week: ***  Min/Day: ***    Exercise Prescription:    Based on: {{Exercise Prescription:18392}   Frequency:  *** days/week   Mode: {Mode:78597}   Duration: *** total aerobic minutes   Intensity: RPE ***       Target HR ***       METS ***       SpO2 Range *** %       O2 Flow Rate *** L/min     Modality METS Load Duration   1 Pre-Exercise *** *** ***   2 Treadmill *** *** *** ***   3 Nustep 4000 *** *** *** ***   4 Recumbent Cycle *** *** *** ***   5 Weights *** *** *** ***   6 Post-Exercise *** *** ***     Exercise Goals: ***  Exercise Interventions/Education: ***    Other Core Components/Risk Factor Assessment:    Medication adherence:   Current Medications:   Current Outpatient Medications   Medication  Sig Dispense Refill    acetaminophen (Tylenol) 325 mg tablet TAKE 2 TABLETS EVERY 6 HOURS AS NEEDED.      amiodarone (Pacerone) 200 mg tablet take 200 mg (1 tablet) by mouth one time a day for health maintenance 90 tablet 1    amLODIPine (Norvasc) 5 mg tablet Take 1 tablet (5 mg) by mouth once daily. 90 tablet 1    atorvastatin (Lipitor) 80 mg tablet Take 1 tablet (80 mg) by mouth once daily. 90 tablet 1    empagliflozin (Jardiance) 10 mg 1 daily      FreeStyle glucose monitoring kit Check sugar daily 1 each 0    FreeStyle Test strip 1 each once daily. 100 each 3    hydroCHLOROthiazide (HYDRODiuril) 25 mg tablet TAKE 1 TABLET (25MG) BY MOUTH ONCE DAILY FOR DIURETIC 90 tablet 1    lancets misc Use daily 100 each 3    losartan (Cozaar) 25 mg tablet Take 1 tablet (25 mg) by mouth once daily. 90 tablet 1    metFORMIN (Glucophage) 500 mg tablet Take 1 tablet (500 mg) by mouth 2 times a day with meals. 180 tablet 0    metoprolol tartrate (Lopressor) 50 mg tablet Take 1 tablet by mouth 2 times a day. 180 tablet 0    pantoprazole (ProtoNix) 40 mg EC tablet Take 1 tablet (40 mg) by mouth once daily in the morning. Take before meals. 90 tablet 1    True Metrix Glucose Meter misc Check sugar daily      Unilet Lancet 33 gauge misc USE AS DIRECTED ONCE DAILY       No current facility-administered medications for this visit.                 Taking medications as prescribed: {Yes/No:68434}   If no, why: ***   Uses pill box/organizer: {Yes/No:72728}   Carries medication list: {Yes/No:87446}    Blood Pressure Management:  Hx of Hypertension: {Yes/No:88846}  Resting BP: Growth %ile SmartLinks can only be used for patients less than 20 years old.     Heart Failure Management:  Hx of Heart Failure:{Yes/No:03548}  Type (selection): {Type (selection):89445}  Most recent EF: @LVEF%@  Date:***  Onset of heart failure diagnosis: ***  Last heart failure hospitalization: ***  Number of HF admissions per year:  ***  Symptoms:{Symptoms:77343}  Is there a family Hx of HF:{Yes/No:56463}  Does patient obtain daily weight {Yes/No:28854}    Smoking/Tobacco Assessment:    Tobacco Use: Low Risk  (12/19/2023)    Patient History     Smoking Tobacco Use: Never     Smokeless Tobacco Use: Never     Passive Exposure: Not on file       Other Core Component Goals: ***  Other Core Component Interventions/Education: ***       Individual Patient Goals:  ***      [facility] Staff Signature: Sola Rodriguez RN  Electronic MD Review Signature: ***

## 2023-12-21 ENCOUNTER — APPOINTMENT (OUTPATIENT)
Dept: CARDIOLOGY | Facility: CLINIC | Age: 63
End: 2023-12-21
Payer: COMMERCIAL

## 2023-12-22 ENCOUNTER — CLINICAL SUPPORT (OUTPATIENT)
Dept: CARDIAC REHAB | Facility: HOSPITAL | Age: 63
End: 2023-12-22
Payer: COMMERCIAL

## 2023-12-22 ENCOUNTER — TELEPHONE (OUTPATIENT)
Dept: CARDIOLOGY | Facility: CLINIC | Age: 63
End: 2023-12-22

## 2023-12-22 DIAGNOSIS — Z95.2 HEART VALVE REPLACED: ICD-10-CM

## 2023-12-22 PROCEDURE — 93798 PHYS/QHP OP CAR RHAB W/ECG: CPT | Performed by: INTERNAL MEDICINE

## 2023-12-27 ENCOUNTER — CLINICAL SUPPORT (OUTPATIENT)
Dept: CARDIAC REHAB | Facility: HOSPITAL | Age: 63
End: 2023-12-27
Payer: COMMERCIAL

## 2023-12-27 DIAGNOSIS — Z95.2 HEART VALVE REPLACED: ICD-10-CM

## 2023-12-27 PROCEDURE — 93798 PHYS/QHP OP CAR RHAB W/ECG: CPT | Performed by: INTERNAL MEDICINE

## 2023-12-29 ENCOUNTER — APPOINTMENT (OUTPATIENT)
Dept: CARDIOLOGY | Facility: CLINIC | Age: 63
End: 2023-12-29
Payer: COMMERCIAL

## 2023-12-29 ENCOUNTER — CLINICAL SUPPORT (OUTPATIENT)
Dept: CARDIAC REHAB | Facility: HOSPITAL | Age: 63
End: 2023-12-29
Payer: COMMERCIAL

## 2023-12-29 DIAGNOSIS — Z95.2 HEART VALVE REPLACED: ICD-10-CM

## 2023-12-29 PROCEDURE — 93798 PHYS/QHP OP CAR RHAB W/ECG: CPT | Performed by: INTERNAL MEDICINE

## 2024-01-03 ENCOUNTER — CLINICAL SUPPORT (OUTPATIENT)
Dept: CARDIAC REHAB | Facility: HOSPITAL | Age: 64
End: 2024-01-03
Payer: COMMERCIAL

## 2024-01-03 DIAGNOSIS — Z95.2 HEART VALVE REPLACED: ICD-10-CM

## 2024-01-03 PROCEDURE — 93798 PHYS/QHP OP CAR RHAB W/ECG: CPT | Performed by: INTERNAL MEDICINE

## 2024-01-04 ENCOUNTER — PATIENT OUTREACH (OUTPATIENT)
Dept: PRIMARY CARE | Facility: CLINIC | Age: 64
End: 2024-01-04
Payer: COMMERCIAL

## 2024-01-04 NOTE — PROGRESS NOTES
Call regarding 2 month discharge follow up.    At time of outreach call the patient feels as if their condition has improved since last visit.  He has started cardiac rehab and everything is going well. States he is doing good.  Reviewed the PCP appointment with the pt and addressed any questions or concerns.

## 2024-01-05 ENCOUNTER — CLINICAL SUPPORT (OUTPATIENT)
Dept: CARDIAC REHAB | Facility: HOSPITAL | Age: 64
End: 2024-01-05
Payer: COMMERCIAL

## 2024-01-05 DIAGNOSIS — M19.90 OSTEOARTHRITIS, UNSPECIFIED OSTEOARTHRITIS TYPE, UNSPECIFIED SITE: Primary | ICD-10-CM

## 2024-01-05 DIAGNOSIS — Z95.2 HEART VALVE REPLACED: ICD-10-CM

## 2024-01-05 PROCEDURE — 93798 PHYS/QHP OP CAR RHAB W/ECG: CPT | Performed by: INTERNAL MEDICINE

## 2024-01-05 RX ORDER — MELOXICAM 15 MG/1
15 TABLET ORAL DAILY
Qty: 30 TABLET | Refills: 11 | Status: SHIPPED | OUTPATIENT
Start: 2024-01-05 | End: 2025-01-04

## 2024-01-08 ENCOUNTER — CLINICAL SUPPORT (OUTPATIENT)
Dept: CARDIAC REHAB | Facility: HOSPITAL | Age: 64
End: 2024-01-08
Payer: COMMERCIAL

## 2024-01-08 DIAGNOSIS — Z95.2 HEART VALVE REPLACED: ICD-10-CM

## 2024-01-08 PROCEDURE — 93798 PHYS/QHP OP CAR RHAB W/ECG: CPT | Performed by: INTERNAL MEDICINE

## 2024-01-10 ENCOUNTER — CLINICAL SUPPORT (OUTPATIENT)
Dept: CARDIAC REHAB | Facility: HOSPITAL | Age: 64
End: 2024-01-10
Payer: COMMERCIAL

## 2024-01-10 DIAGNOSIS — Z95.2 HEART VALVE REPLACED: ICD-10-CM

## 2024-01-10 PROCEDURE — 93798 PHYS/QHP OP CAR RHAB W/ECG: CPT | Performed by: INTERNAL MEDICINE

## 2024-01-12 ENCOUNTER — CLINICAL SUPPORT (OUTPATIENT)
Dept: CARDIAC REHAB | Facility: HOSPITAL | Age: 64
End: 2024-01-12
Payer: COMMERCIAL

## 2024-01-12 DIAGNOSIS — Z95.2 HEART VALVE REPLACED: ICD-10-CM

## 2024-01-12 PROCEDURE — 93798 PHYS/QHP OP CAR RHAB W/ECG: CPT | Performed by: INTERNAL MEDICINE

## 2024-01-15 ENCOUNTER — APPOINTMENT (OUTPATIENT)
Dept: CARDIAC REHAB | Facility: HOSPITAL | Age: 64
End: 2024-01-15
Payer: COMMERCIAL

## 2024-01-17 ENCOUNTER — CLINICAL SUPPORT (OUTPATIENT)
Dept: CARDIAC REHAB | Facility: HOSPITAL | Age: 64
End: 2024-01-17
Payer: COMMERCIAL

## 2024-01-17 DIAGNOSIS — Z95.2 HEART VALVE REPLACED: ICD-10-CM

## 2024-01-17 PROCEDURE — 93798 PHYS/QHP OP CAR RHAB W/ECG: CPT | Performed by: INTERNAL MEDICINE

## 2024-01-19 ENCOUNTER — APPOINTMENT (OUTPATIENT)
Dept: CARDIAC REHAB | Facility: HOSPITAL | Age: 64
End: 2024-01-19
Payer: COMMERCIAL

## 2024-01-22 ENCOUNTER — CLINICAL SUPPORT (OUTPATIENT)
Dept: CARDIAC REHAB | Facility: HOSPITAL | Age: 64
End: 2024-01-22
Payer: COMMERCIAL

## 2024-01-22 DIAGNOSIS — Z95.2 HEART VALVE REPLACED: ICD-10-CM

## 2024-01-22 PROCEDURE — 93798 PHYS/QHP OP CAR RHAB W/ECG: CPT | Performed by: INTERNAL MEDICINE

## 2024-01-24 ENCOUNTER — CLINICAL SUPPORT (OUTPATIENT)
Dept: CARDIAC REHAB | Facility: HOSPITAL | Age: 64
End: 2024-01-24
Payer: COMMERCIAL

## 2024-01-24 DIAGNOSIS — Z95.2 HEART VALVE REPLACED: ICD-10-CM

## 2024-01-24 PROCEDURE — 93798 PHYS/QHP OP CAR RHAB W/ECG: CPT | Performed by: INTERNAL MEDICINE

## 2024-01-26 ENCOUNTER — CLINICAL SUPPORT (OUTPATIENT)
Dept: CARDIAC REHAB | Facility: HOSPITAL | Age: 64
End: 2024-01-26
Payer: COMMERCIAL

## 2024-01-26 DIAGNOSIS — Z95.2 HEART VALVE REPLACED: ICD-10-CM

## 2024-01-26 PROCEDURE — 93798 PHYS/QHP OP CAR RHAB W/ECG: CPT | Performed by: INTERNAL MEDICINE

## 2024-01-29 ENCOUNTER — CLINICAL SUPPORT (OUTPATIENT)
Dept: CARDIAC REHAB | Facility: HOSPITAL | Age: 64
End: 2024-01-29
Payer: COMMERCIAL

## 2024-01-29 DIAGNOSIS — Z95.2 HEART VALVE REPLACED: ICD-10-CM

## 2024-01-29 PROCEDURE — 93798 PHYS/QHP OP CAR RHAB W/ECG: CPT | Performed by: INTERNAL MEDICINE

## 2024-01-31 ENCOUNTER — CLINICAL SUPPORT (OUTPATIENT)
Dept: CARDIAC REHAB | Facility: HOSPITAL | Age: 64
End: 2024-01-31
Payer: COMMERCIAL

## 2024-01-31 DIAGNOSIS — Z95.2 HEART VALVE REPLACED: ICD-10-CM

## 2024-01-31 PROCEDURE — 93798 PHYS/QHP OP CAR RHAB W/ECG: CPT | Performed by: INTERNAL MEDICINE

## 2024-02-02 ENCOUNTER — CLINICAL SUPPORT (OUTPATIENT)
Dept: CARDIAC REHAB | Facility: HOSPITAL | Age: 64
End: 2024-02-02
Payer: COMMERCIAL

## 2024-02-02 ENCOUNTER — OFFICE VISIT (OUTPATIENT)
Dept: PRIMARY CARE | Facility: CLINIC | Age: 64
End: 2024-02-02
Payer: COMMERCIAL

## 2024-02-02 VITALS
WEIGHT: 303.8 LBS | TEMPERATURE: 97.5 F | BODY MASS INDEX: 37.77 KG/M2 | HEART RATE: 75 BPM | OXYGEN SATURATION: 93 % | HEIGHT: 75 IN | DIASTOLIC BLOOD PRESSURE: 81 MMHG | SYSTOLIC BLOOD PRESSURE: 134 MMHG

## 2024-02-02 DIAGNOSIS — I10 BENIGN ESSENTIAL HYPERTENSION: ICD-10-CM

## 2024-02-02 DIAGNOSIS — I10 HYPERTENSION, UNSPECIFIED TYPE: ICD-10-CM

## 2024-02-02 DIAGNOSIS — E11.69 TYPE 2 DIABETES MELLITUS WITH OTHER SPECIFIED COMPLICATION, WITHOUT LONG-TERM CURRENT USE OF INSULIN (MULTI): Primary | ICD-10-CM

## 2024-02-02 DIAGNOSIS — Z95.2 HEART VALVE REPLACED: ICD-10-CM

## 2024-02-02 DIAGNOSIS — E78.5 HYPERLIPIDEMIA, UNSPECIFIED HYPERLIPIDEMIA TYPE: ICD-10-CM

## 2024-02-02 PROCEDURE — 4010F ACE/ARB THERAPY RXD/TAKEN: CPT | Performed by: FAMILY MEDICINE

## 2024-02-02 PROCEDURE — 93798 PHYS/QHP OP CAR RHAB W/ECG: CPT | Performed by: INTERNAL MEDICINE

## 2024-02-02 PROCEDURE — 3079F DIAST BP 80-89 MM HG: CPT | Performed by: FAMILY MEDICINE

## 2024-02-02 PROCEDURE — 1036F TOBACCO NON-USER: CPT | Performed by: FAMILY MEDICINE

## 2024-02-02 PROCEDURE — 3075F SYST BP GE 130 - 139MM HG: CPT | Performed by: FAMILY MEDICINE

## 2024-02-02 PROCEDURE — 3008F BODY MASS INDEX DOCD: CPT | Performed by: FAMILY MEDICINE

## 2024-02-02 PROCEDURE — 99214 OFFICE O/P EST MOD 30 MIN: CPT | Performed by: FAMILY MEDICINE

## 2024-02-02 RX ORDER — ATORVASTATIN CALCIUM 80 MG/1
80 TABLET, FILM COATED ORAL DAILY
Qty: 90 TABLET | Refills: 1 | Status: SHIPPED | OUTPATIENT
Start: 2024-02-02

## 2024-02-02 ASSESSMENT — PATIENT HEALTH QUESTIONNAIRE - PHQ9
1. LITTLE INTEREST OR PLEASURE IN DOING THINGS: NOT AT ALL
SUM OF ALL RESPONSES TO PHQ9 QUESTIONS 1 AND 2: 0
2. FEELING DOWN, DEPRESSED OR HOPELESS: NOT AT ALL

## 2024-02-02 NOTE — PROGRESS NOTES
Cardiac Rehabilitation {UH CR/IA/VR NOTE TYPE:66677}    Name: Jules Neves  Medical Record Number: [id number]  YOB: 1960  Age: 63 y.o.    Today’s Date: 2/2/2024  Primary Care Physician: Prieto Orozco DO  Referring Physician: Dwight Harrison MD  Program Location: [facility] CARDIAC REHAB     [facility]  Primary Diagnosis:   1. Heart valve replaced  Follow Up In Cardiac Rehab         Onset/Date of Diagnosis: ***    {UH CR/IA/VR Session Number:04272}    AACVPR Risk Stratification:       Falls Risk: {UH CR/IA/VR FALLS RISK:26684}  Psychosocial Assessment     No data recorded    Sent PH-Q 9 to MD if score > 20: {UH CR/IA/VR PHQ-9 SENT TO MD:47410}    Pt reported/currently experiencing stress: {UH CR/IA/VR Reported Stress:33075}  Patient uses stress management skills: {YES/NO:200010}  History of: {Hx Anxiety/Depression:04169}  Currently seeing a mental health provider: {Yes/No:83175}  Social Support: {Yes/No:32521}  Quality of Life Survey: {UH CR/IA/VR QOLS:77076}  Learning Assessment:  Learning assessment/barriers: {Assessment/barriers:11734}  Preferred learning method: {Preferred learning method:72757}  Barriers: {Barriers to Education:84033}  Comments:    Stages of Change:{Stages of change:98619}    Psychosocial Plan    Goal Status: {UH CR/IA/VR Goal Status:23914}    Psychosocial Goals: {UH CR/IA/VR PSYCHOSOCIAL GOALS:46228}    Psychosocial Interventions/Education: {UH CR/IA/VR To be done in Cardiac Rehab:40050}    Nutrition Assessment:    Hyperlipidemia: {YES/NO:200010}    Lipids:   Lab Results   Component Value Date    CHOL 109 04/25/2023    HDL 28.7 (A) 04/25/2023    LDLF 54 04/25/2023    TRIG 134 04/25/2023       Current Dietary Guidelines: {Dietary Guidelines:26701:x}  Barriers to dietary change: {response; yes (wildcard)/no:839628}    Diet Habit Survey: Picture Your Plate  Pre: {UH CR/IA/VR PYP Initial:46496}  Post: {UH CR/IA/VR PYP Post:38471}    Diabetes Assessment    Lab Results   Component  Value Date    HGBA1C 6.5 (A) 08/30/2023       History of Diabetes: { CR/RI/VR DM ASSESSMENT:85717}    Weight Management       No data recorded    Nutrition Plan    Goal Status: { CR/RI/VR Goal Status:31255}    Nutrition Goals: { CR/RI/VR Nutrition Goals:28691}    Nutrition Interventions/Education:   { CR/RI/VR To be done in Cardiac Rehab:92195}      Exercise Assessment    { CR/RI/VR Current Home Exercise:85337}    Exercise Prescription     Exercise Prescription based on: { CR/RI/VR ExRx Evaluation:19684}   Frequency:  { CR/RI/VR Session Frequency:41341} days/week   Mode: {Mode:08865}   Duration: *** total aerobic minutes   Intensity: RPE ***  Target HR:  { CR/RI/VR Target HR:73046}  MET Level: ***  Patient wears supplemental O2: { CR/RI/VR Supplemental O2:09489}     Modality Workload METs Duration (minutes)   1 Pre-Exercise      2 { CR/RI/VR Exercise Modality:56858} ***  *** *** :00   3 { CR/RI/VR Exercise Modality:16927} ***  *** *** :00   4 { CR/RI/VR Exercise Modality:50416} ***  *** *** :00   5 { CR/RI/VR Exercise Modality:89892} ***  *** *** :00   6 Post-Exercise        Resistance Training: {YES/NO:623009}  Home Exercise Prescription given: { CR/RI/VR Home Ex Rx:64706}    Exercise Plan    Goal Status: { CR/RI/VR Goal Status:17443}    Exercise Goals: { CR/RI/VR Exercise Goals:13408}    Exercise Interventions/Education:   { CR/RI/VR To be done in Cardiac Rehab:42401}      Other Core Components/Risk Factor Assessment:    Medication adherence  Current Medications:   Medication Documentation Review Audit       Reviewed by Lashawn Arellano CMA (Medical Assistant) on 12/19/23 at 1412      Medication Order Taking? Sig Documenting Provider Last Dose Status   acetaminophen (Tylenol) 325 mg tablet [id number] Yes TAKE 2 TABLETS EVERY 6 HOURS AS NEEDED. Historical Provider, MD Taking Active   amiodarone (Pacerone) 200 mg tablet [id number] Yes take 200 mg (1 tablet) by mouth one time a day  for health maintenance Prieto Orozco, DO Taking Active   amLODIPine (Norvasc) 5 mg tablet [id number] Yes Take 1 tablet (5 mg) by mouth once daily. Prieto Orozco, DO Taking Active   Discontinued 12/19/23 1410   atorvastatin (Lipitor) 80 mg tablet [id number] Yes Take 1 tablet (80 mg) by mouth once daily. Prieto Orozco, DO Taking Active   empagliflozin (Jardiance) 10 mg [id number]  1 daily Historical Provider, MD  Active   FreeStyle glucose monitoring kit [id number] Yes Check sugar daily Prieto Orozco, DO Taking Active   FreeStyle Test strip [id number] Yes 1 each once daily. Prieto Orozco DO Taking Active   hydroCHLOROthiazide (HYDRODiuril) 25 mg tablet [id number] Yes TAKE 1 TABLET (25MG) BY MOUTH ONCE DAILY FOR DIURETIC Prieto Orozco, DO Taking Active   Discontinued 12/19/23 1411   lancets misc [id number] Yes Use daily Prieto Orozco DO Taking Active   losartan (Cozaar) 25 mg tablet [id number] Yes Take 1 tablet (25 mg) by mouth once daily. Prieto Orozco, DO Taking Active   metFORMIN (Glucophage) 500 mg tablet [id number] Yes Take 1 tablet (500 mg) by mouth 2 times a day with meals. Prieto Orozco DO Taking Active   metoprolol tartrate (Lopressor) 50 mg tablet [id number] Yes Take 1 tablet by mouth 2 times a day. Prieto Orozco, DO Taking Active   Discontinued 12/19/23 1411   pantoprazole (ProtoNix) 40 mg EC tablet [id number] Yes Take 1 tablet (40 mg) by mouth once daily in the morning. Take before meals. Prieto Orozco, DO Taking Active   True Metrix Glucose Meter misc [id number] Yes Check sugar daily Historical Provider, MD  Active   Unilet Lancet 33 gauge misc [id number] Yes USE AS DIRECTED ONCE DAILY Historical Provider, MD  Active                                 Medication compliance: {UH GEN YES NO WILDCARD WILDCARD:45492}   Uses pill box/organizer: {YES/NO:200010}   Carries medication list: {YES/NO:200010}    Blood Pressure Management  History of Hypertension: {YES/NO:200010}  Medication  Changes: {YES/NO:200010}  Resting BP:  There were no vitals taken for this visit.     Heart Failure Management  Hx of Heart Failure: {UH CR/GA/VR HF Management:39939}    Smoking/Tobacco Assessment  Social History     Tobacco Use   Smoking Status Never   Smokeless Tobacco Never       Other Core Component Plan    Goal Status: {UH CR/GA/VR Goal Status:92844}    Other Core Component Goals: {UH CR/GA/VR Other Goals:21369}    Other Core Component Interventions/Education:   { CR/GA/VR Other Goals:13412}    Individual Patient Goals:    ***  ***    Goal Status: { CR/GA/VR Goal Status:70006}    Staff Comments:  ***    Rehab Staff Signature: Tomas Beltran, RRT

## 2024-02-02 NOTE — PROGRESS NOTES
Review of Systems   Constitutional: Negative.    HENT: Negative.     Respiratory: Negative.     Cardiovascular:  Positive for palpitations. Negative for chest pain and leg swelling.   Gastrointestinal: Negative.    Genitourinary: Negative.    Musculoskeletal: Negative.    Constitutional:       Appearance: Normal appearance.   HENT:      Head: Normocephalic.      Nose: Nose normal.      Mouth/Throat:      Mouth: Mucous membranes are moist.   Eyes:      Extraocular Movements: Extraocular movements intact.      Pupils: Pupils are equal, round, and reactive to light.   Cardiovascular:      Rate and Rhythm: Normal rate and regular rhythm.   Pulmonary:      Effort: Pulmonary effort is normal.   Musculoskeletal:         General: Normal range of motion.      Cervical back: Normal range of motion.   Skin:     General: Skin is warm and dry.   Neurological:      Mental Status: He is alert.     Subjective   Patient ID: Jules Neves is a 63 y.o. male.    HPI    Review of Systems    Objective   Physical Exam    Assessment/Plan   There are no diagnoses linked to this encounter.

## 2024-02-05 ENCOUNTER — CLINICAL SUPPORT (OUTPATIENT)
Dept: CARDIAC REHAB | Facility: HOSPITAL | Age: 64
End: 2024-02-05
Payer: COMMERCIAL

## 2024-02-05 DIAGNOSIS — Z95.2 HEART VALVE REPLACED: ICD-10-CM

## 2024-02-05 PROCEDURE — 93798 PHYS/QHP OP CAR RHAB W/ECG: CPT | Performed by: INTERNAL MEDICINE

## 2024-02-07 ENCOUNTER — CLINICAL SUPPORT (OUTPATIENT)
Dept: CARDIAC REHAB | Facility: HOSPITAL | Age: 64
End: 2024-02-07
Payer: COMMERCIAL

## 2024-02-07 DIAGNOSIS — Z95.2 HEART VALVE REPLACED: ICD-10-CM

## 2024-02-07 PROCEDURE — 93798 PHYS/QHP OP CAR RHAB W/ECG: CPT | Performed by: INTERNAL MEDICINE

## 2024-02-09 ENCOUNTER — CLINICAL SUPPORT (OUTPATIENT)
Dept: CARDIAC REHAB | Facility: HOSPITAL | Age: 64
End: 2024-02-09
Payer: COMMERCIAL

## 2024-02-09 DIAGNOSIS — Z95.2 HEART VALVE REPLACED: ICD-10-CM

## 2024-02-09 PROCEDURE — 93798 PHYS/QHP OP CAR RHAB W/ECG: CPT | Performed by: INTERNAL MEDICINE

## 2024-02-12 ENCOUNTER — CLINICAL SUPPORT (OUTPATIENT)
Dept: CARDIAC REHAB | Facility: HOSPITAL | Age: 64
End: 2024-02-12
Payer: COMMERCIAL

## 2024-02-12 DIAGNOSIS — Z95.2 HEART VALVE REPLACED: ICD-10-CM

## 2024-02-12 PROCEDURE — 93798 PHYS/QHP OP CAR RHAB W/ECG: CPT | Performed by: INTERNAL MEDICINE

## 2024-02-14 ENCOUNTER — CLINICAL SUPPORT (OUTPATIENT)
Dept: CARDIAC REHAB | Facility: HOSPITAL | Age: 64
End: 2024-02-14
Payer: COMMERCIAL

## 2024-02-14 DIAGNOSIS — Z95.2 HEART VALVE REPLACED: ICD-10-CM

## 2024-02-14 PROCEDURE — 93798 PHYS/QHP OP CAR RHAB W/ECG: CPT | Performed by: INTERNAL MEDICINE

## 2024-02-15 ENCOUNTER — LAB (OUTPATIENT)
Dept: LAB | Facility: LAB | Age: 64
End: 2024-02-15
Payer: COMMERCIAL

## 2024-02-15 DIAGNOSIS — E11.69 TYPE 2 DIABETES MELLITUS WITH OTHER SPECIFIED COMPLICATION, WITHOUT LONG-TERM CURRENT USE OF INSULIN (MULTI): ICD-10-CM

## 2024-02-15 DIAGNOSIS — I10 HYPERTENSION, UNSPECIFIED TYPE: ICD-10-CM

## 2024-02-15 LAB
ALBUMIN SERPL BCP-MCNC: 4.4 G/DL (ref 3.4–5)
ALP SERPL-CCNC: 59 U/L (ref 33–136)
ALT SERPL W P-5'-P-CCNC: 39 U/L (ref 10–52)
ANION GAP SERPL CALC-SCNC: 9 MMOL/L (ref 10–20)
AST SERPL W P-5'-P-CCNC: 30 U/L (ref 9–39)
BILIRUB DIRECT SERPL-MCNC: 0.2 MG/DL (ref 0–0.3)
BILIRUB SERPL-MCNC: 0.9 MG/DL (ref 0–1.2)
BUN SERPL-MCNC: 13 MG/DL (ref 6–23)
CALCIUM SERPL-MCNC: 9.4 MG/DL (ref 8.6–10.3)
CHLORIDE SERPL-SCNC: 104 MMOL/L (ref 98–107)
CHOLEST SERPL-MCNC: 108 MG/DL (ref 0–199)
CHOLESTEROL/HDL RATIO: 2.8
CO2 SERPL-SCNC: 30 MMOL/L (ref 21–32)
CREAT SERPL-MCNC: 0.89 MG/DL (ref 0.5–1.3)
EGFRCR SERPLBLD CKD-EPI 2021: >90 ML/MIN/1.73M*2
EST. AVERAGE GLUCOSE BLD GHB EST-MCNC: 120 MG/DL
GLUCOSE SERPL-MCNC: 108 MG/DL (ref 74–99)
HBA1C MFR BLD: 5.8 %
HDLC SERPL-MCNC: 38.3 MG/DL
LDLC SERPL CALC-MCNC: 54 MG/DL
NON HDL CHOLESTEROL: 70 MG/DL (ref 0–149)
POTASSIUM SERPL-SCNC: 4.4 MMOL/L (ref 3.5–5.3)
PROT SERPL-MCNC: 7 G/DL (ref 6.4–8.2)
SODIUM SERPL-SCNC: 139 MMOL/L (ref 136–145)
TRIGL SERPL-MCNC: 81 MG/DL (ref 0–149)
VLDL: 16 MG/DL (ref 0–40)

## 2024-02-15 PROCEDURE — 82248 BILIRUBIN DIRECT: CPT

## 2024-02-15 PROCEDURE — 83036 HEMOGLOBIN GLYCOSYLATED A1C: CPT

## 2024-02-15 PROCEDURE — 36415 COLL VENOUS BLD VENIPUNCTURE: CPT

## 2024-02-15 PROCEDURE — 80061 LIPID PANEL: CPT

## 2024-02-15 PROCEDURE — 80053 COMPREHEN METABOLIC PANEL: CPT

## 2024-02-16 ENCOUNTER — CLINICAL SUPPORT (OUTPATIENT)
Dept: CARDIAC REHAB | Facility: HOSPITAL | Age: 64
End: 2024-02-16
Payer: COMMERCIAL

## 2024-02-16 ENCOUNTER — LAB (OUTPATIENT)
Dept: LAB | Facility: LAB | Age: 64
End: 2024-02-16
Payer: COMMERCIAL

## 2024-02-16 DIAGNOSIS — E11.69 TYPE 2 DIABETES MELLITUS WITH OTHER SPECIFIED COMPLICATION, WITHOUT LONG-TERM CURRENT USE OF INSULIN (MULTI): ICD-10-CM

## 2024-02-16 DIAGNOSIS — I10 HYPERTENSION, UNSPECIFIED TYPE: ICD-10-CM

## 2024-02-16 DIAGNOSIS — Z95.2 HEART VALVE REPLACED: ICD-10-CM

## 2024-02-16 LAB
CREAT UR-MCNC: 86.4 MG/DL (ref 20–370)
PROT UR-ACNC: 9 MG/DL (ref 5–25)
PROT/CREAT UR: 0.1 MG/MG CREAT (ref 0–0.17)

## 2024-02-16 PROCEDURE — 84156 ASSAY OF PROTEIN URINE: CPT

## 2024-02-16 PROCEDURE — 82570 ASSAY OF URINE CREATININE: CPT

## 2024-02-16 PROCEDURE — 93798 PHYS/QHP OP CAR RHAB W/ECG: CPT | Performed by: INTERNAL MEDICINE

## 2024-02-19 ENCOUNTER — CLINICAL SUPPORT (OUTPATIENT)
Dept: CARDIAC REHAB | Facility: HOSPITAL | Age: 64
End: 2024-02-19
Payer: COMMERCIAL

## 2024-02-19 DIAGNOSIS — Z95.2 HEART VALVE REPLACED: ICD-10-CM

## 2024-02-19 PROCEDURE — 93798 PHYS/QHP OP CAR RHAB W/ECG: CPT | Performed by: INTERNAL MEDICINE

## 2024-02-21 ENCOUNTER — CLINICAL SUPPORT (OUTPATIENT)
Dept: CARDIAC REHAB | Facility: HOSPITAL | Age: 64
End: 2024-02-21
Payer: COMMERCIAL

## 2024-02-21 DIAGNOSIS — Z95.2 HEART VALVE REPLACED: ICD-10-CM

## 2024-02-21 PROCEDURE — 93798 PHYS/QHP OP CAR RHAB W/ECG: CPT | Performed by: INTERNAL MEDICINE

## 2024-02-23 ENCOUNTER — CLINICAL SUPPORT (OUTPATIENT)
Dept: CARDIAC REHAB | Facility: HOSPITAL | Age: 64
End: 2024-02-23
Payer: COMMERCIAL

## 2024-02-23 DIAGNOSIS — Z95.2 HEART VALVE REPLACED: ICD-10-CM

## 2024-02-23 PROCEDURE — 93798 PHYS/QHP OP CAR RHAB W/ECG: CPT | Performed by: INTERNAL MEDICINE

## 2024-02-26 ENCOUNTER — CLINICAL SUPPORT (OUTPATIENT)
Dept: CARDIAC REHAB | Facility: HOSPITAL | Age: 64
End: 2024-02-26
Payer: COMMERCIAL

## 2024-02-26 DIAGNOSIS — Z95.2 HEART VALVE REPLACED: ICD-10-CM

## 2024-02-26 PROCEDURE — 93798 PHYS/QHP OP CAR RHAB W/ECG: CPT | Performed by: INTERNAL MEDICINE

## 2024-02-27 PROBLEM — I25.10 ARTERIOSCLEROSIS OF CORONARY ARTERY: Status: ACTIVE | Noted: 2021-04-16

## 2024-02-27 PROBLEM — G47.33 OBSTRUCTIVE SLEEP APNEA, ADULT: Status: RESOLVED | Noted: 2024-02-27 | Resolved: 2024-02-27

## 2024-02-27 PROBLEM — I31.39 OTHER PERICARDIAL EFFUSION (NONINFLAMMATORY) (HHS-HCC): Status: RESOLVED | Noted: 2023-10-30 | Resolved: 2024-02-27

## 2024-02-27 PROBLEM — D50.0 ANEMIA DUE TO BLOOD LOSS: Status: RESOLVED | Noted: 2024-02-27 | Resolved: 2024-02-27

## 2024-02-27 PROBLEM — E86.1 ABSOLUTE HYPOVOLEMIA: Status: RESOLVED | Noted: 2024-02-27 | Resolved: 2024-02-27

## 2024-02-27 PROBLEM — I71.20 THORACIC AORTIC ANEURYSM WITHOUT RUPTURE (CMS-HCC): Status: ACTIVE | Noted: 2023-10-27

## 2024-02-27 PROBLEM — E66.9 OBESITY: Status: ACTIVE | Noted: 2023-10-27

## 2024-02-27 PROBLEM — Z95.2 S/P AVR (AORTIC VALVE REPLACEMENT): Status: RESOLVED | Noted: 2024-02-27 | Resolved: 2024-02-27

## 2024-02-27 PROBLEM — E66.9 TYPE 2 DIABETES MELLITUS WITH OBESITY (MULTI): Status: ACTIVE | Noted: 2023-09-26

## 2024-02-27 PROBLEM — Q23.81 BICUSPID AORTIC VALVE: Status: RESOLVED | Noted: 2023-10-27 | Resolved: 2024-02-27

## 2024-02-27 PROBLEM — R06.09 DYSPNEA ON EXERTION: Status: RESOLVED | Noted: 2024-02-27 | Resolved: 2024-02-27

## 2024-02-27 PROBLEM — F19.21 HISTORY OF SUBSTANCE DEPENDENCE (MULTI): Status: RESOLVED | Noted: 2024-02-27 | Resolved: 2024-02-27

## 2024-02-27 PROBLEM — J98.01 BRONCHOSPASM: Status: RESOLVED | Noted: 2024-02-27 | Resolved: 2024-02-27

## 2024-02-27 PROBLEM — Q23.1 BICUSPID AORTIC VALVE (HHS-HCC): Status: RESOLVED | Noted: 2023-10-27 | Resolved: 2024-02-27

## 2024-02-27 PROBLEM — S82.53XA FRACTURE OF MEDIAL MALLEOLUS: Status: RESOLVED | Noted: 2024-02-27 | Resolved: 2024-02-27

## 2024-02-27 PROBLEM — R07.89 ATYPICAL CHEST PAIN: Status: RESOLVED | Noted: 2024-02-27 | Resolved: 2024-02-27

## 2024-02-27 PROBLEM — E11.69 TYPE 2 DIABETES MELLITUS WITH OBESITY (MULTI): Status: ACTIVE | Noted: 2023-09-26

## 2024-02-27 PROBLEM — J81.0 ACUTE PULMONARY EDEMA (MULTI): Status: RESOLVED | Noted: 2024-02-27 | Resolved: 2024-02-27

## 2024-02-27 PROBLEM — R35.1 NOCTURIA: Status: RESOLVED | Noted: 2024-02-27 | Resolved: 2024-02-27

## 2024-02-27 PROBLEM — I10 PRIMARY HYPERTENSION: Status: RESOLVED | Noted: 2024-02-27 | Resolved: 2024-02-27

## 2024-02-27 PROBLEM — J06.9 VIRAL UPPER RESPIRATORY TRACT INFECTION: Status: ACTIVE | Noted: 2024-02-27

## 2024-02-27 PROBLEM — J98.11 PULMONARY ATELECTASIS: Status: RESOLVED | Noted: 2024-02-27 | Resolved: 2024-02-27

## 2024-02-27 NOTE — PROGRESS NOTES
Jules returns for follow up status post 8/28/23 BIOBental avr root asc surgery. Last echo 12/5/23, last ct 10/25/23. Nikki Ross RN

## 2024-02-28 ENCOUNTER — OFFICE VISIT (OUTPATIENT)
Dept: CARDIAC SURGERY | Facility: HOSPITAL | Age: 64
End: 2024-02-28
Payer: COMMERCIAL

## 2024-02-28 ENCOUNTER — CLINICAL SUPPORT (OUTPATIENT)
Dept: CARDIAC REHAB | Facility: HOSPITAL | Age: 64
End: 2024-02-28
Payer: COMMERCIAL

## 2024-02-28 DIAGNOSIS — Z95.2 HEART VALVE REPLACED: ICD-10-CM

## 2024-02-28 DIAGNOSIS — Z95.828 STATUS POST ASCENDING AORTIC REPLACEMENT: ICD-10-CM

## 2024-02-28 DIAGNOSIS — R00.2 PALPITATIONS: ICD-10-CM

## 2024-02-28 DIAGNOSIS — Z95.2 STATUS POST AORTIC VALVE REPLACEMENT: ICD-10-CM

## 2024-02-28 PROCEDURE — 3048F LDL-C <100 MG/DL: CPT | Performed by: THORACIC SURGERY (CARDIOTHORACIC VASCULAR SURGERY)

## 2024-02-28 PROCEDURE — 99215 OFFICE O/P EST HI 40 MIN: CPT | Performed by: THORACIC SURGERY (CARDIOTHORACIC VASCULAR SURGERY)

## 2024-02-28 PROCEDURE — 4010F ACE/ARB THERAPY RXD/TAKEN: CPT | Performed by: THORACIC SURGERY (CARDIOTHORACIC VASCULAR SURGERY)

## 2024-02-28 PROCEDURE — 93798 PHYS/QHP OP CAR RHAB W/ECG: CPT | Performed by: INTERNAL MEDICINE

## 2024-02-28 PROCEDURE — 1036F TOBACCO NON-USER: CPT | Performed by: THORACIC SURGERY (CARDIOTHORACIC VASCULAR SURGERY)

## 2024-02-28 PROCEDURE — 3044F HG A1C LEVEL LT 7.0%: CPT | Performed by: THORACIC SURGERY (CARDIOTHORACIC VASCULAR SURGERY)

## 2024-02-28 PROCEDURE — 3061F NEG MICROALBUMINURIA REV: CPT | Performed by: THORACIC SURGERY (CARDIOTHORACIC VASCULAR SURGERY)

## 2024-02-28 PROCEDURE — 3008F BODY MASS INDEX DOCD: CPT | Performed by: THORACIC SURGERY (CARDIOTHORACIC VASCULAR SURGERY)

## 2024-02-29 DIAGNOSIS — Z95.2 STATUS POST AORTIC VALVE REPLACEMENT: ICD-10-CM

## 2024-03-01 ENCOUNTER — CLINICAL SUPPORT (OUTPATIENT)
Dept: CARDIAC REHAB | Facility: HOSPITAL | Age: 64
End: 2024-03-01
Payer: COMMERCIAL

## 2024-03-01 DIAGNOSIS — Z95.2 HEART VALVE REPLACED: ICD-10-CM

## 2024-03-01 PROCEDURE — 93798 PHYS/QHP OP CAR RHAB W/ECG: CPT | Performed by: INTERNAL MEDICINE

## 2024-03-04 ENCOUNTER — CLINICAL SUPPORT (OUTPATIENT)
Dept: CARDIAC REHAB | Facility: HOSPITAL | Age: 64
End: 2024-03-04
Payer: COMMERCIAL

## 2024-03-04 DIAGNOSIS — Z95.2 HEART VALVE REPLACED: ICD-10-CM

## 2024-03-04 PROCEDURE — 93798 PHYS/QHP OP CAR RHAB W/ECG: CPT | Performed by: INTERNAL MEDICINE

## 2024-03-06 ENCOUNTER — CLINICAL SUPPORT (OUTPATIENT)
Dept: CARDIAC REHAB | Facility: HOSPITAL | Age: 64
End: 2024-03-06
Payer: COMMERCIAL

## 2024-03-06 DIAGNOSIS — Z95.2 HEART VALVE REPLACED: ICD-10-CM

## 2024-03-06 PROCEDURE — 93798 PHYS/QHP OP CAR RHAB W/ECG: CPT | Performed by: INTERNAL MEDICINE

## 2024-03-08 ENCOUNTER — CLINICAL SUPPORT (OUTPATIENT)
Dept: CARDIAC REHAB | Facility: HOSPITAL | Age: 64
End: 2024-03-08
Payer: COMMERCIAL

## 2024-03-08 DIAGNOSIS — Z95.2 HEART VALVE REPLACED: ICD-10-CM

## 2024-03-08 PROCEDURE — 93798 PHYS/QHP OP CAR RHAB W/ECG: CPT | Performed by: INTERNAL MEDICINE

## 2024-03-11 ENCOUNTER — CLINICAL SUPPORT (OUTPATIENT)
Dept: CARDIAC REHAB | Facility: HOSPITAL | Age: 64
End: 2024-03-11
Payer: COMMERCIAL

## 2024-03-11 DIAGNOSIS — Z95.2 HEART VALVE REPLACED: ICD-10-CM

## 2024-03-11 PROCEDURE — 93798 PHYS/QHP OP CAR RHAB W/ECG: CPT | Performed by: INTERNAL MEDICINE

## 2024-03-13 ENCOUNTER — CLINICAL SUPPORT (OUTPATIENT)
Dept: CARDIAC REHAB | Facility: HOSPITAL | Age: 64
End: 2024-03-13
Payer: COMMERCIAL

## 2024-03-13 DIAGNOSIS — Z95.2 HEART VALVE REPLACED: ICD-10-CM

## 2024-03-13 PROCEDURE — 93798 PHYS/QHP OP CAR RHAB W/ECG: CPT | Performed by: INTERNAL MEDICINE

## 2024-03-15 ENCOUNTER — CLINICAL SUPPORT (OUTPATIENT)
Dept: CARDIAC REHAB | Facility: HOSPITAL | Age: 64
End: 2024-03-15
Payer: COMMERCIAL

## 2024-03-15 DIAGNOSIS — Z95.2 HEART VALVE REPLACED: ICD-10-CM

## 2024-03-15 PROCEDURE — 93798 PHYS/QHP OP CAR RHAB W/ECG: CPT | Performed by: INTERNAL MEDICINE

## 2024-03-27 ENCOUNTER — APPOINTMENT (OUTPATIENT)
Dept: CARDIAC SURGERY | Facility: HOSPITAL | Age: 64
End: 2024-03-27
Payer: COMMERCIAL

## 2024-04-29 DIAGNOSIS — I10 HYPERTENSION, UNSPECIFIED TYPE: ICD-10-CM

## 2024-04-29 RX ORDER — AMLODIPINE BESYLATE 5 MG/1
5 TABLET ORAL DAILY
Qty: 90 TABLET | Refills: 1 | Status: SHIPPED | OUTPATIENT
Start: 2024-04-29

## 2024-04-29 RX ORDER — HYDROCHLOROTHIAZIDE 25 MG/1
TABLET ORAL
Qty: 90 TABLET | Refills: 1 | Status: SHIPPED | OUTPATIENT
Start: 2024-04-29

## 2024-04-29 RX ORDER — LOSARTAN POTASSIUM 25 MG/1
25 TABLET ORAL DAILY
Qty: 90 TABLET | Refills: 1 | Status: SHIPPED | OUTPATIENT
Start: 2024-04-29 | End: 2025-04-29

## 2024-04-29 RX ORDER — PANTOPRAZOLE SODIUM 40 MG/1
40 TABLET, DELAYED RELEASE ORAL
Qty: 90 TABLET | Refills: 1 | Status: SHIPPED | OUTPATIENT
Start: 2024-04-29

## 2024-04-29 RX ORDER — AMIODARONE HYDROCHLORIDE 200 MG/1
TABLET ORAL
Qty: 90 TABLET | Refills: 1 | Status: SHIPPED | OUTPATIENT
Start: 2024-04-29

## 2024-05-06 DIAGNOSIS — I10 HYPERTENSION, UNSPECIFIED TYPE: ICD-10-CM

## 2024-05-06 RX ORDER — METOPROLOL TARTRATE 50 MG/1
50 TABLET ORAL 2 TIMES DAILY
Qty: 180 TABLET | Refills: 0 | Status: SHIPPED | OUTPATIENT
Start: 2024-05-06

## 2024-05-14 ENCOUNTER — HOSPITAL ENCOUNTER (OUTPATIENT)
Dept: CARDIOLOGY | Facility: CLINIC | Age: 64
Discharge: HOME | End: 2024-05-14
Payer: COMMERCIAL

## 2024-05-14 ENCOUNTER — OFFICE VISIT (OUTPATIENT)
Dept: CARDIOLOGY | Facility: CLINIC | Age: 64
End: 2024-05-14
Payer: COMMERCIAL

## 2024-05-14 ENCOUNTER — HOSPITAL ENCOUNTER (OUTPATIENT)
Dept: VASCULAR MEDICINE | Facility: CLINIC | Age: 64
Discharge: HOME | End: 2024-05-14
Payer: COMMERCIAL

## 2024-05-14 VITALS
OXYGEN SATURATION: 96 % | SYSTOLIC BLOOD PRESSURE: 130 MMHG | HEIGHT: 75 IN | DIASTOLIC BLOOD PRESSURE: 74 MMHG | HEART RATE: 64 BPM | BODY MASS INDEX: 39.17 KG/M2 | WEIGHT: 315 LBS

## 2024-05-14 VITALS — WEIGHT: 315 LBS | BODY MASS INDEX: 40.87 KG/M2

## 2024-05-14 DIAGNOSIS — Z95.828 S/P ASCENDING AORTIC REPLACEMENT: ICD-10-CM

## 2024-05-14 DIAGNOSIS — Q24.8 LEFT VENTRICULAR OUTFLOW OBSTRUCTION (HHS-HCC): ICD-10-CM

## 2024-05-14 DIAGNOSIS — I25.10 ARTERIOSCLEROSIS OF CORONARY ARTERY: ICD-10-CM

## 2024-05-14 DIAGNOSIS — Z95.2 HEART VALVE REPLACED: ICD-10-CM

## 2024-05-14 DIAGNOSIS — E78.00 HYPERCHOLESTEREMIA: ICD-10-CM

## 2024-05-14 DIAGNOSIS — Z95.2 S/P AVR (AORTIC VALVE REPLACEMENT): ICD-10-CM

## 2024-05-14 DIAGNOSIS — I35.0 NONRHEUMATIC AORTIC VALVE STENOSIS: ICD-10-CM

## 2024-05-14 DIAGNOSIS — I10 BENIGN ESSENTIAL HYPERTENSION: ICD-10-CM

## 2024-05-14 DIAGNOSIS — R00.2 PALPITATIONS: ICD-10-CM

## 2024-05-14 DIAGNOSIS — R09.89 OTHER SPECIFIED SYMPTOMS AND SIGNS INVOLVING THE CIRCULATORY AND RESPIRATORY SYSTEMS: ICD-10-CM

## 2024-05-14 DIAGNOSIS — I42.2 HYPERTROPHIC CARDIOMYOPATHY (MULTI): Primary | ICD-10-CM

## 2024-05-14 DIAGNOSIS — Q23.1 AORTIC REGURGITATION DUE TO BICUSPID AORTIC VALVE (HHS-HCC): ICD-10-CM

## 2024-05-14 DIAGNOSIS — E78.5 HYPERLIPIDEMIA, UNSPECIFIED HYPERLIPIDEMIA TYPE: ICD-10-CM

## 2024-05-14 DIAGNOSIS — G45.9 TIA (TRANSIENT ISCHEMIC ATTACK): ICD-10-CM

## 2024-05-14 DIAGNOSIS — I49.3 FREQUENT PVCS: ICD-10-CM

## 2024-05-14 PROBLEM — I51.9 HEART DISEASE: Status: RESOLVED | Noted: 2022-05-12 | Resolved: 2024-05-14

## 2024-05-14 PROBLEM — I35.1 MODERATE AORTIC INSUFFICIENCY: Status: RESOLVED | Noted: 2023-10-27 | Resolved: 2024-05-14

## 2024-05-14 PROBLEM — Q23.81 AORTIC REGURGITATION DUE TO BICUSPID AORTIC VALVE: Status: RESOLVED | Noted: 2023-10-27 | Resolved: 2024-05-14

## 2024-05-14 PROBLEM — I71.20 THORACIC AORTIC ANEURYSM WITHOUT RUPTURE (CMS-HCC): Status: RESOLVED | Noted: 2023-10-27 | Resolved: 2024-05-14

## 2024-05-14 PROCEDURE — 99214 OFFICE O/P EST MOD 30 MIN: CPT | Performed by: INTERNAL MEDICINE

## 2024-05-14 PROCEDURE — 3078F DIAST BP <80 MM HG: CPT | Performed by: INTERNAL MEDICINE

## 2024-05-14 PROCEDURE — 3075F SYST BP GE 130 - 139MM HG: CPT | Performed by: INTERNAL MEDICINE

## 2024-05-14 PROCEDURE — 4010F ACE/ARB THERAPY RXD/TAKEN: CPT | Performed by: INTERNAL MEDICINE

## 2024-05-14 PROCEDURE — 3048F LDL-C <100 MG/DL: CPT | Performed by: INTERNAL MEDICINE

## 2024-05-14 PROCEDURE — 2500000004 HC RX 250 GENERAL PHARMACY W/ HCPCS (ALT 636 FOR OP/ED): Performed by: INTERNAL MEDICINE

## 2024-05-14 PROCEDURE — 3061F NEG MICROALBUMINURIA REV: CPT | Performed by: INTERNAL MEDICINE

## 2024-05-14 PROCEDURE — 93308 TTE F-UP OR LMTD: CPT | Performed by: INTERNAL MEDICINE

## 2024-05-14 PROCEDURE — 1036F TOBACCO NON-USER: CPT | Performed by: INTERNAL MEDICINE

## 2024-05-14 PROCEDURE — 93880 EXTRACRANIAL BILAT STUDY: CPT

## 2024-05-14 PROCEDURE — 3008F BODY MASS INDEX DOCD: CPT | Performed by: INTERNAL MEDICINE

## 2024-05-14 PROCEDURE — 3044F HG A1C LEVEL LT 7.0%: CPT | Performed by: INTERNAL MEDICINE

## 2024-05-14 PROCEDURE — 93880 EXTRACRANIAL BILAT STUDY: CPT | Performed by: INTERNAL MEDICINE

## 2024-05-14 PROCEDURE — 93308 TTE F-UP OR LMTD: CPT

## 2024-05-14 RX ADMIN — PERFLUTREN 2 ML OF DILUTION: 6.52 INJECTION, SUSPENSION INTRAVENOUS at 13:45

## 2024-05-14 NOTE — PATIENT INSTRUCTIONS
There is a problem with the blood flow out of the heart due to excess muscle buildup    We are going to schedule you for a cardiac MRI.  If we cannot do this test, we will schedule you for a trans-esophageal echocardiogram.

## 2024-05-14 NOTE — PROGRESS NOTES
Subjective   Jules Neves is a 63 y.o. male.    Chief Complaint:  Follow-up (Carotid Echo tests results consult)    HPI    He has been back to work for a few months.  He tells me that he has had a very hard time getting started.  Feels very fatigued.  Gets short of breath very quickly.  No syncopal events.  No chest discomfort.  Denies orthopnea or paroxysmal nocturnal dyspnea.    In May 2023 he was referred to [facility] [place].  His CT of the chest demonstrated a 4.7 cm ascending aortic aneurysm.  His echo showed a peak 32 mm gradient across aortic valve with a mean 23 mm gradient across the aortic valve left ventricular function was normal with a 65 to 70% ejection fraction.      He underwent surgery with aortic valve replacement and ascending aortic aneurysm surgery.    His cardiac catheterization prior to surgery demonstrated minimal coronary disease.  He did had transient neurologic symptoms after the surgery.    His cardiac history is significant for the presence of a bicuspid aortic valve. He has aortic stenosis and aortic regurgitation. His past CT of the chest  demonstrated an ascending aortic aneurysm. He also has calcifications in the distribution of the coronary artery is also seen in documented on CT scanning.     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     Allergies  Medication    · No Known Drug Allergies   Recorded By: Galilea Manrique; 3/19/2014 10:11:51 AM     Family History  Mother    · No pertinent family history     Social History  Problems    · Does not use illicit drugs (V49.89) (Z78.9)   · Former smoker (V15.82) (Z87.891)   · used cigars once in awhile. Pt. stopped in early 1-1-2014   · No alcohol use   · Occasional caffeine consumption        Review of Systems   Constitutional: Positive for malaise/fatigue.   Cardiovascular:  Positive for dyspnea on exertion.   Respiratory:  Positive for shortness of breath and sleep  "disturbances due to breathing.    Musculoskeletal:  Positive for arthritis and joint pain.     ROS    Current Outpatient Medications   Medication Sig Dispense Refill    acetaminophen (Tylenol) 325 mg tablet TAKE 2 TABLETS EVERY 6 HOURS AS NEEDED.      amiodarone (Pacerone) 200 mg tablet TAKE 1 TABLET (200mg) BY MOUTH ONCE DAILY for health maintenance 90 tablet 1    amLODIPine (Norvasc) 5 mg tablet Take 1 tablet (5 mg) by mouth once daily. 90 tablet 1    atorvastatin (Lipitor) 80 mg tablet Take 1 tablet (80 mg) by mouth once daily. 90 tablet 1    empagliflozin (Jardiance) 10 mg 1 daily      FreeStyle glucose monitoring kit Check sugar daily 1 each 0    FreeStyle Test strip 1 each once daily. 100 each 3    hydroCHLOROthiazide (HYDRODiuril) 25 mg tablet TAKE 1 TABLET (25MG) BY MOUTH ONCE DAILY FOR DIURETIC 90 tablet 1    lancets misc Use daily 100 each 3    losartan (Cozaar) 25 mg tablet Take 1 tablet (25 mg) by mouth once daily. 90 tablet 1    meloxicam (Mobic) 15 mg tablet Take 1 tablet (15 mg) by mouth once daily. 30 tablet 11    metFORMIN (Glucophage) 500 mg tablet Take 1 tablet (500 mg) by mouth 2 times a day with meals. 180 tablet 0    metoprolol tartrate (Lopressor) 50 mg tablet Take 1 tablet by mouth 2 times a day. 180 tablet 0    pantoprazole (ProtoNix) 40 mg EC tablet Take 1 tablet (40 mg) by mouth once daily in the morning. Take before meals. 90 tablet 1    True Metrix Glucose Meter misc Check sugar daily      Unilet Lancet 33 gauge misc USE AS DIRECTED ONCE DAILY       No current facility-administered medications for this visit.        Visit Vitals  /74 (BP Location: Right arm)   Pulse 64   Ht 1.905 m (6' 3\")   Wt 148 kg (327 lb)   SpO2 96%   BMI 40.87 kg/m²   Smoking Status Never   BSA 2.8 m²        Objective     Constitutional:       Appearance: Not in distress.   Neck:      Vascular: JVD normal.   Pulmonary:      Breath sounds: Normal breath sounds.   Cardiovascular:      Normal rate. Regular " rhythm. S1 with normal intensity. S2 with normal intensity.       Murmurs: There is a grade 3/6 systolic murmur.      No gallop.    Pulses:     Intact distal pulses.   Edema:     Peripheral edema absent.   Abdominal:      General: Bowel sounds are normal.   Neurological:      Mental Status: Alert and oriented to person, place and time.         Assessment:    1.  Status post aortic valve replacement.  The aortic valve appears normal.    2.  Status post ascending aortic aneurysm repair.  Ascending aorta appears normal by echo on today's visit.    3.  Left ventricular outflow tract obstruction.  Very high gradients in the left ventricular outflow tract up to 120 mmHg with Valsalva.  Not sure what is going on.  Not clear where the outflow tract obstruction is at whether this represents subaortic stenosis or true outflow tract obstruction.    Discussed with Dr. Rowe.  Will proceed with a cardiac MRI study.    4.  Hypertension.  Blood pressures are normal at today's visit.    5.  Hyperlipidemia.  Cholesterol 108, HDL 38, LDL 54.    6.  Carotid disease.  Mild carotid disease on the basis of his latest carotid duplex study.

## 2024-05-15 LAB
AORTIC VALVE MEAN GRADIENT: 14.8 MMHG
AORTIC VALVE PEAK VELOCITY: 2.85 M/S
AV PEAK GRADIENT: 32.4 MMHG
AVA (PEAK VEL): 1.08 CM2
AVA (VTI): 1.18 CM2
EJECTION FRACTION APICAL 4 CHAMBER: 70.3
LEFT VENTRICLE INTERNAL DIMENSION DIASTOLE: 4.66 CM (ref 3.5–6)
LEFT VENTRICULAR OUTFLOW TRACT DIAMETER: 2.12 CM
LV EJECTION FRACTION BIPLANE: 66 %

## 2024-05-28 DIAGNOSIS — E11.69 TYPE 2 DIABETES MELLITUS WITH OTHER SPECIFIED COMPLICATION, WITHOUT LONG-TERM CURRENT USE OF INSULIN (MULTI): ICD-10-CM

## 2024-05-28 RX ORDER — METFORMIN HYDROCHLORIDE 500 MG/1
500 TABLET ORAL
Qty: 180 TABLET | Refills: 0 | Status: SHIPPED | OUTPATIENT
Start: 2024-05-28

## 2024-06-16 NOTE — PROGRESS NOTES
Subjective   Jules Neves is a 63 y.o. male.    Chief Complaint:  1mo    HPI    He is back to work full-time.  However he he does last physical activities and physical work.  No chest pressure or heaviness.  On his last visit we found a very high outflow tract gradient.  He is scheduled for MRI.    In May 2023 he was referred to [facility].  His CT of the chest demonstrated a 4.7 cm ascending aortic aneurysm.  His echo showed a peak 32 mm gradient across aortic valve with a mean 23 mm gradient across the aortic valve left ventricular function was normal with a 65 to 70% ejection fraction.      He underwent surgery with aortic valve replacement and ascending aortic aneurysm surgery.    His cardiac catheterization prior to surgery demonstrated minimal coronary disease.  He did had transient neurologic symptoms after the surgery.     His cardiac history is significant for the presence of a bicuspid aortic valve. He has aortic stenosis and aortic regurgitation. His past CT of the chest  demonstrated an ascending aortic aneurysm. He also has calcifications in the distribution of the coronary artery is also seen in documented on CT scanning.     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     Allergies  Medication    · No Known Drug Allergies   Recorded By: Galilea Manrique; 3/19/2014 10:11:51 AM     Family History  Mother    · No pertinent family history     Social History  Problems    · Does not use illicit drugs (V49.89) (Z78.9)   · Former smoker (V15.82) (Z87.891)   · used cigars once in awhile. Pt. stopped in early 1-1-2014   · No alcohol use   · Occasional caffeine consumption     Review of Systems   Constitutional: Positive for malaise/fatigue.   Cardiovascular:  Positive for dyspnea on exertion.   Respiratory:  Positive for shortness of breath and sleep disturbances due to breathing.    Musculoskeletal:  Positive for arthritis and joint pain.     "    Current Outpatient Medications   Medication Sig Dispense Refill    acetaminophen (Tylenol) 325 mg tablet TAKE 2 TABLETS EVERY 6 HOURS AS NEEDED.      amLODIPine (Norvasc) 5 mg tablet Take 1 tablet (5 mg) by mouth once daily. 90 tablet 1    atorvastatin (Lipitor) 80 mg tablet Take 1 tablet (80 mg) by mouth once daily. 90 tablet 1    empagliflozin (Jardiance) 10 mg 1 daily      FreeStyle glucose monitoring kit Check sugar daily 1 each 0    FreeStyle Test strip 1 each once daily. 100 each 3    lancets misc Use daily 100 each 3    losartan (Cozaar) 25 mg tablet Take 1 tablet (25 mg) by mouth once daily. 90 tablet 1    meloxicam (Mobic) 15 mg tablet Take 1 tablet (15 mg) by mouth once daily. 30 tablet 11    metFORMIN (Glucophage) 500 mg tablet Take 1 tablet (500 mg) by mouth 2 times a day with meals. 180 tablet 0    metoprolol tartrate (Lopressor) 50 mg tablet Take 1 tablet by mouth 2 times a day. 180 tablet 0    pantoprazole (ProtoNix) 40 mg EC tablet Take 1 tablet (40 mg) by mouth once daily in the morning. Take before meals. 90 tablet 1    True Metrix Glucose Meter misc Check sugar daily      Unilet Lancet 33 gauge misc USE AS DIRECTED ONCE DAILY       No current facility-administered medications for this visit.        Visit Vitals  /80   Pulse 67   Ht 1.905 m (6' 3\")   Wt 148 kg (326 lb)   SpO2 97%   BMI 40.75 kg/m²   Smoking Status Never   BSA 2.8 m²        Objective     Constitutional:       Appearance: Not in distress.   Neck:      Vascular: JVD normal.   Pulmonary:      Breath sounds: Normal breath sounds.   Cardiovascular:      Normal rate. Regular rhythm. S1 with normal intensity. S2 with normal intensity.       Murmurs: There is a grade 2/6 systolic murmur.      No gallop.    Pulses:     Intact distal pulses.   Edema:     Peripheral edema present.     Pretibial: bilateral 1+ edema of the pretibial area.  Abdominal:      General: Bowel sounds are normal.   Neurological:      Mental Status: Alert and " oriented to person, place and time.         Lab Review:   Lab Results   Component Value Date     02/15/2024    K 4.4 02/15/2024     02/15/2024    CO2 30 02/15/2024    BUN 13 02/15/2024    CREATININE 0.89 02/15/2024    GLUCOSE 108 (H) 02/15/2024    CALCIUM 9.4 02/15/2024     Lab Results   Component Value Date    CHOL 108 02/15/2024    TRIG 81 02/15/2024    HDL 38.3 02/15/2024       Assessment:    1.  Status post aortic valve replacement.  Last echo study showed a normally functioning bioprosthetic aortic valve.    2.  Left ventricular outflow tract obstruction.  Cardiac MRI pending.  If he has a very elevated left ventricular outflow tract gradient we would refer back to cardiothoracic surgery for evaluation.  Depending on where the outflow tract gradient is located, Camzyos may be an option.    3.  Status post ascending aortic aneurysm repair.    4.  Hypertension.  Blood pressures are controlled.    5.  Hyperlipidemia.  LDL is 54.

## 2024-06-21 ENCOUNTER — APPOINTMENT (OUTPATIENT)
Dept: CARDIOLOGY | Facility: CLINIC | Age: 64
End: 2024-06-21
Payer: COMMERCIAL

## 2024-06-21 VITALS
DIASTOLIC BLOOD PRESSURE: 80 MMHG | OXYGEN SATURATION: 97 % | HEIGHT: 75 IN | WEIGHT: 315 LBS | SYSTOLIC BLOOD PRESSURE: 130 MMHG | HEART RATE: 67 BPM | BODY MASS INDEX: 39.17 KG/M2

## 2024-06-21 DIAGNOSIS — I10 BENIGN ESSENTIAL HYPERTENSION: ICD-10-CM

## 2024-06-21 DIAGNOSIS — I49.3 FREQUENT PVCS: ICD-10-CM

## 2024-06-21 DIAGNOSIS — I42.2 HYPERTROPHIC CARDIOMYOPATHY (MULTI): ICD-10-CM

## 2024-06-21 DIAGNOSIS — I35.0 NONRHEUMATIC AORTIC VALVE STENOSIS: Primary | ICD-10-CM

## 2024-06-21 DIAGNOSIS — Q24.8 LEFT VENTRICULAR OUTFLOW OBSTRUCTION (HHS-HCC): ICD-10-CM

## 2024-06-21 DIAGNOSIS — E78.5 HYPERLIPIDEMIA, UNSPECIFIED HYPERLIPIDEMIA TYPE: ICD-10-CM

## 2024-06-21 DIAGNOSIS — I25.10 ARTERIOSCLEROSIS OF CORONARY ARTERY: ICD-10-CM

## 2024-06-21 DIAGNOSIS — Z95.2 S/P AVR (AORTIC VALVE REPLACEMENT): ICD-10-CM

## 2024-06-21 DIAGNOSIS — Z95.828 S/P ASCENDING AORTIC REPLACEMENT: ICD-10-CM

## 2024-06-21 PROCEDURE — 3048F LDL-C <100 MG/DL: CPT | Performed by: INTERNAL MEDICINE

## 2024-06-21 PROCEDURE — 3061F NEG MICROALBUMINURIA REV: CPT | Performed by: INTERNAL MEDICINE

## 2024-06-21 PROCEDURE — 3008F BODY MASS INDEX DOCD: CPT | Performed by: INTERNAL MEDICINE

## 2024-06-21 PROCEDURE — 3075F SYST BP GE 130 - 139MM HG: CPT | Performed by: INTERNAL MEDICINE

## 2024-06-21 PROCEDURE — 99213 OFFICE O/P EST LOW 20 MIN: CPT | Performed by: INTERNAL MEDICINE

## 2024-06-21 PROCEDURE — 3044F HG A1C LEVEL LT 7.0%: CPT | Performed by: INTERNAL MEDICINE

## 2024-06-21 PROCEDURE — 4010F ACE/ARB THERAPY RXD/TAKEN: CPT | Performed by: INTERNAL MEDICINE

## 2024-06-21 PROCEDURE — 1036F TOBACCO NON-USER: CPT | Performed by: INTERNAL MEDICINE

## 2024-06-21 PROCEDURE — 3079F DIAST BP 80-89 MM HG: CPT | Performed by: INTERNAL MEDICINE

## 2024-06-21 NOTE — PATIENT INSTRUCTIONS
Stop hydrochlorothiazide 25 mg one daily.    Make sure that you are off the amiodarone.    We will see you back in August after the Cardiac MRI.

## 2024-07-11 ENCOUNTER — APPOINTMENT (OUTPATIENT)
Dept: RADIOLOGY | Facility: HOSPITAL | Age: 64
End: 2024-07-11
Payer: COMMERCIAL

## 2024-07-25 ENCOUNTER — APPOINTMENT (OUTPATIENT)
Dept: CARDIOLOGY | Facility: CLINIC | Age: 64
End: 2024-07-25
Payer: COMMERCIAL

## 2024-07-25 ENCOUNTER — HOSPITAL ENCOUNTER (OUTPATIENT)
Dept: RADIOLOGY | Facility: HOSPITAL | Age: 64
Discharge: HOME | End: 2024-07-25
Payer: COMMERCIAL

## 2024-07-25 DIAGNOSIS — S00.259A METAL FOREIGN BODY IN EYE REGION: ICD-10-CM

## 2024-07-25 DIAGNOSIS — I42.2 HYPERTROPHIC CARDIOMYOPATHY (MULTI): ICD-10-CM

## 2024-07-25 DIAGNOSIS — Q24.8 LEFT VENTRICULAR OUTFLOW OBSTRUCTION (HHS-HCC): ICD-10-CM

## 2024-07-25 PROCEDURE — 75561 CARDIAC MRI FOR MORPH W/DYE: CPT

## 2024-07-25 PROCEDURE — A9575 INJ GADOTERATE MEGLUMI 0.1ML: HCPCS | Performed by: INTERNAL MEDICINE

## 2024-07-25 PROCEDURE — 70250 X-RAY EXAM OF SKULL: CPT

## 2024-07-25 PROCEDURE — 2550000001 HC RX 255 CONTRASTS: Performed by: INTERNAL MEDICINE

## 2024-07-25 RX ORDER — GADOTERATE MEGLUMINE 376.9 MG/ML
40 INJECTION INTRAVENOUS
Status: COMPLETED | OUTPATIENT
Start: 2024-07-25 | End: 2024-07-25

## 2024-07-29 ENCOUNTER — APPOINTMENT (OUTPATIENT)
Dept: CARDIOLOGY | Facility: CLINIC | Age: 64
End: 2024-07-29
Payer: COMMERCIAL

## 2024-07-29 VITALS — WEIGHT: 315 LBS | HEIGHT: 75 IN | BODY MASS INDEX: 39.17 KG/M2

## 2024-07-29 DIAGNOSIS — Q24.8 LEFT VENTRICULAR OUTFLOW OBSTRUCTION (HHS-HCC): ICD-10-CM

## 2024-07-29 DIAGNOSIS — I42.2 HYPERTROPHIC CARDIOMYOPATHY (MULTI): ICD-10-CM

## 2024-07-29 DIAGNOSIS — I10 BENIGN ESSENTIAL HYPERTENSION: ICD-10-CM

## 2024-07-29 DIAGNOSIS — I25.10 ARTERIOSCLEROSIS OF CORONARY ARTERY: Primary | ICD-10-CM

## 2024-07-29 DIAGNOSIS — E78.5 HYPERLIPIDEMIA, UNSPECIFIED HYPERLIPIDEMIA TYPE: ICD-10-CM

## 2024-07-29 DIAGNOSIS — R00.2 PALPITATIONS: ICD-10-CM

## 2024-07-29 DIAGNOSIS — Z95.2 S/P AVR (AORTIC VALVE REPLACEMENT): ICD-10-CM

## 2024-07-29 DIAGNOSIS — I49.3 FREQUENT PVCS: ICD-10-CM

## 2024-07-29 DIAGNOSIS — Z95.828 S/P ASCENDING AORTIC REPLACEMENT: ICD-10-CM

## 2024-07-29 PROCEDURE — 3061F NEG MICROALBUMINURIA REV: CPT | Performed by: INTERNAL MEDICINE

## 2024-07-29 PROCEDURE — 3048F LDL-C <100 MG/DL: CPT | Performed by: INTERNAL MEDICINE

## 2024-07-29 PROCEDURE — 3044F HG A1C LEVEL LT 7.0%: CPT | Performed by: INTERNAL MEDICINE

## 2024-07-29 PROCEDURE — 4010F ACE/ARB THERAPY RXD/TAKEN: CPT | Performed by: INTERNAL MEDICINE

## 2024-07-29 PROCEDURE — 99213 OFFICE O/P EST LOW 20 MIN: CPT | Performed by: INTERNAL MEDICINE

## 2024-07-29 PROCEDURE — 3008F BODY MASS INDEX DOCD: CPT | Performed by: INTERNAL MEDICINE

## 2024-07-29 RX ORDER — AMIODARONE HYDROCHLORIDE 200 MG/1
TABLET ORAL
COMMUNITY

## 2024-07-29 NOTE — PROGRESS NOTES
Subjective   Jules Neves is a 63 y.o. male.    Chief Complaint:  Follow-up cardiac MRI    HPI:    He underwent a cardiac MRI.  We are doing a telephone visit for follow-up of the results.  On his last visit in June 2024 he noted that he has only mild symptoms.  He is back to work.  He has a physical job with the [employer].    An echocardiographic study performed on May 14, 2024 demonstrated 100 mm left ventricular outflow tract gradient which increased to 120 mm with Valsalva.  This demonstrated a normally functioning bioprosthetic aortic valve.    In May 2023 he was referred to [facility].  His CT of the chest demonstrated a 4.7 cm ascending aortic aneurysm.  His echo showed a peak 32 mm gradient across aortic valve with a mean 23 mm gradient across the aortic valve left ventricular function was normal with a 65 to 70% ejection fraction.      He underwent surgery with aortic valve replacement and ascending aortic aneurysm surgery.    His cardiac catheterization prior to surgery demonstrated minimal coronary disease.  He did had transient neurologic symptoms after the surgery.     His cardiac history is significant for the presence of a bicuspid aortic valve. He has aortic stenosis and aortic regurgitation. His past CT of the chest  demonstrated an ascending aortic aneurysm. He also has calcifications in the distribution of the coronary artery is also seen in documented on CT scanning.     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     Allergies  Medication    · No Known Drug Allergies   Recorded By: Galilea Manrique; 3/19/2014 10:11:51 AM     Family History  Mother    · No pertinent family history     Social History  Problems    · Does not use illicit drugs (V49.89) (Z78.9)   · Former smoker (V15.82) (Z87.891)   · used cigars once in awhile. Pt. stopped in early 1-1-2014   · No alcohol use   · Occasional caffeine  consumption     Review of Systems   Constitutional: Positive for malaise/fatigue.   Cardiovascular:  Positive for dyspnea on exertion.   Respiratory:  Positive for shortness of breath and sleep disturbances due to breathing.    Musculoskeletal:  Positive for arthritis and joint pain.     Current Outpatient Medications   Medication Sig Dispense Refill    acetaminophen (Tylenol) 325 mg tablet TAKE 2 TABLETS EVERY 6 HOURS AS NEEDED.      amLODIPine (Norvasc) 5 mg tablet Take 1 tablet (5 mg) by mouth once daily. 90 tablet 1    atorvastatin (Lipitor) 80 mg tablet Take 1 tablet (80 mg) by mouth once daily. 90 tablet 1    empagliflozin (Jardiance) 10 mg 1 daily      FreeStyle glucose monitoring kit Check sugar daily 1 each 0    FreeStyle Test strip 1 each once daily. 100 each 3    lancets misc Use daily 100 each 3    losartan (Cozaar) 25 mg tablet Take 1 tablet (25 mg) by mouth once daily. 90 tablet 1    meloxicam (Mobic) 15 mg tablet Take 1 tablet (15 mg) by mouth once daily. 30 tablet 11    metFORMIN (Glucophage) 500 mg tablet Take 1 tablet (500 mg) by mouth 2 times a day with meals. 180 tablet 0    metoprolol tartrate (Lopressor) 50 mg tablet Take 1 tablet by mouth 2 times a day. 180 tablet 0    pantoprazole (ProtoNix) 40 mg EC tablet Take 1 tablet (40 mg) by mouth once daily in the morning. Take before meals. 90 tablet 1    True Metrix Glucose Meter misc Check sugar daily      Unilet Lancet 33 gauge misc USE AS DIRECTED ONCE DAILY       No current facility-administered medications for this visit.        Visit Vitals  Smoking Status Never        Objective     Lab Review:   Lab Results   Component Value Date     02/15/2024    K 4.4 02/15/2024     02/15/2024    CO2 30 02/15/2024    BUN 13 02/15/2024    CREATININE 0.89 02/15/2024    GLUCOSE 108 (H) 02/15/2024    CALCIUM 9.4 02/15/2024     Lab Results   Component Value Date    CHOL 108 02/15/2024    TRIG 81 02/15/2024    HDL 38.3 02/15/2024        Assessment:    1.  Hypertrophic cardiomyopathy with left ventricular outflow tract obstruction.  The patient's cardiac MRI demonstrates a maximal wall thickness of the interventricular septum of 2.8 cm.  The outflow tract gradient by cardiac MRI could not be assessed because of the presence of the bioprosthetic aortic valve.  He is on beta-blocker therapy.  Will start Camzyos.  Will notify the hypertrophic cardiomyopathy team

## 2024-07-31 ENCOUNTER — TELEPHONE (OUTPATIENT)
Dept: CARDIOLOGY | Facility: HOSPITAL | Age: 64
End: 2024-07-31
Payer: COMMERCIAL

## 2024-07-31 NOTE — TELEPHONE ENCOUNTER
Patient has agreed to start on Camzyos (Mavacamten), to treat symptomatic obstructive hypertrophic cardiomyopathy. Education, Camzyos REMs certification completed.  Questions & concerns addressed to ensure safe administration which included the following:    Heart Failure: The most serious risk of treatment with Camzyos (Mavacamten) is heart failure due to systolic dysfunction.  Patient informed to call, or seek medical attention for symptoms of heart failure.   Shortness of breath    Chest pain   Fatigue   Palpitations   Swelling in the legs   Rapid weight gain    Drug Interactions:  Certain medications/supplements can increase or decrease the exposure of Camzyos (Mavacamten) in your bloodstream.  Both prescription & over- the- counter medications that are contraindicated were reviewed.  Advised patient to call department & dispensing pharmacy before starting or stopping any medication, including over-the- counter preparations.     Echocardiograms: Patient aware & agrees to comply with required limited echocardiograms for appropriate dose titration.      Week  4   Week   8   Week 12   4 weeks after a change in dose   4 weeks after treatment interruption    4 weeks & 12 weeks after staring certain medications that are known to affect Camzyos     (Mavacamten)      Specialty Pharmacy:  patient aware Camzyos (Mavacamten) is dispensed by certain pharmacies nationwide.  Ongoing treatment with Camzyos (Mavacamten) will include a pharmacist.   It is imperative that the pharmacist be made aware of  any medication changes.  Patient will speak with a pharmacist monthly prior to 30-day drug dispense    Patient able to verbalize an understanding of the Risk Evaluation & Mitigation Strategy (REMS).  REMS certification & Patient Enrollment form completed on with patient consent. Camzyos REMS Patient Brochure emailed to patient.      Patient provided direct contact information to call HCM Nurse Navigator @ [phone] should  any questions or concerns arise.      SP P[employer] [facility]  Member ID: [id number]  PCN:  [id number]  Group:  [id number]  BIN:  [id number]      Camzyos Co-Pay Card - activated  ID: [id number]  Group:  [id number]  BIN:  [id number]  PCN:  [employer]    Echocardiogram Co-pay card ([employer]  [phone])  Member ID: [id number]  Group:  [id number]    Education provided on definition of Hypertrophic Cardiomyopathy (HCM) & the goals of treatment which include minimizing symptoms & reducing the risk of complications.  Reviewed disease state hallmarks, common symptoms & testing that may be needed to develop a comprehensive individualized treatment plan.  Reviewed pharmacologic therapies used to manage HCM as well as; procedures & advanced treatments that may be needed for optimum disease state management & symptom relief.  Discussed HCM is the most common heritable heart disease affecting men & women equally. we reviewed that genetic testing / screening may be recommended for the patient & family, & we could provide assistance if needed. We discussed that HCM can lead to other health issues, so it is important to work in partnership with their medical providers.  This includes eating a healthy diet, moderate physical activity, staying hydrated with at least 8 cups of water daily, & avoid nicotine, vaping, inhalants, recreational drugs, caffeine, & alcohol. Reiterated patients with HCM can enjoy an active lifestyle & normal lifespan.  Patient provided HCM Nurse Navigator direct contact information [phone] & additional resources that me be of benefit & support for the patient & family.  HCM educational binder mailed to patient

## 2024-08-07 DIAGNOSIS — I42.2 HYPERTROPHIC CARDIOMYOPATHY (MULTI): Primary | ICD-10-CM

## 2024-09-03 DIAGNOSIS — E11.69 TYPE 2 DIABETES MELLITUS WITH OTHER SPECIFIED COMPLICATION, WITHOUT LONG-TERM CURRENT USE OF INSULIN (MULTI): ICD-10-CM

## 2024-09-04 ENCOUNTER — TELEPHONE (OUTPATIENT)
Dept: CARDIOLOGY | Facility: HOSPITAL | Age: 64
End: 2024-09-04
Payer: COMMERCIAL

## 2024-09-04 ENCOUNTER — HOSPITAL ENCOUNTER (OUTPATIENT)
Dept: CARDIOLOGY | Facility: CLINIC | Age: 64
Discharge: HOME | End: 2024-09-04
Payer: COMMERCIAL

## 2024-09-04 DIAGNOSIS — Q23.1 CONGENITAL INSUFFICIENCY OF AORTIC VALVE (HHS-HCC): ICD-10-CM

## 2024-09-04 DIAGNOSIS — Z95.828 PRESENCE OF OTHER VASCULAR IMPLANTS AND GRAFTS: ICD-10-CM

## 2024-09-04 DIAGNOSIS — I42.2 HYPERTROPHIC CARDIOMYOPATHY (MULTI): ICD-10-CM

## 2024-09-04 PROCEDURE — 93308 TTE F-UP OR LMTD: CPT | Performed by: INTERNAL MEDICINE

## 2024-09-04 PROCEDURE — 93308 TTE F-UP OR LMTD: CPT

## 2024-09-04 RX ORDER — METFORMIN HYDROCHLORIDE 500 MG/1
500 TABLET ORAL
Qty: 180 TABLET | Refills: 0 | Status: SHIPPED | OUTPATIENT
Start: 2024-09-04

## 2024-09-04 NOTE — TELEPHONE ENCOUNTER
Phoned patient & reviewed echocardiogram results.  Patient aware to continue same dose of Camzyos 5mg (mavacamten) based on LVEF & LVOT gradient parameters. Camzyos REMs portal updated.   Patient aware of next echocardiogram date 10/3/24.  Patient denies symptoms of HF.  Reviewed current medications & counseled on drug to drug interactions. Instructed to call HCM clinic for any questions, clarification, or concerns.    Phoned in prescription to [facility]. Provided Co-Pay card information.  Will request expedited REMs counseling.    Patient can call [facility] @ [phone]

## 2024-09-06 LAB
AORTIC VALVE MEAN GRADIENT: 12.9 MMHG
AORTIC VALVE PEAK VELOCITY: 2.39 M/S
AV PEAK GRADIENT: 22.9 MMHG
AVA (PEAK VEL): 1.84 CM2
AVA (VTI): 1.99 CM2
EJECTION FRACTION APICAL 4 CHAMBER: 65.3
EJECTION FRACTION: 68 %
LEFT VENTRICLE INTERNAL DIMENSION DIASTOLE: 5.36 CM (ref 3.5–6)
LEFT VENTRICULAR OUTFLOW TRACT DIAMETER: 2.37 CM

## 2024-10-03 ENCOUNTER — HOSPITAL ENCOUNTER (OUTPATIENT)
Dept: CARDIOLOGY | Facility: CLINIC | Age: 64
Discharge: HOME | End: 2024-10-03
Payer: COMMERCIAL

## 2024-10-03 DIAGNOSIS — I42.2 HYPERTROPHIC CARDIOMYOPATHY (MULTI): ICD-10-CM

## 2024-10-03 LAB
AORTIC VALVE MEAN GRADIENT: 8.9 MMHG
AORTIC VALVE PEAK VELOCITY: 1.93 M/S
AV PEAK GRADIENT: 14.9 MMHG
AVA (PEAK VEL): 3.41 CM2
AVA (VTI): 3.14 CM2
EJECTION FRACTION APICAL 4 CHAMBER: 58.9
EJECTION FRACTION: 58 %
LEFT VENTRICLE INTERNAL DIMENSION DIASTOLE: 4.43 CM (ref 3.5–6)
LEFT VENTRICULAR OUTFLOW TRACT DIAMETER: 2.44 CM
MITRAL VALVE E/A RATIO: 1.08

## 2024-10-03 PROCEDURE — 2500000004 HC RX 250 GENERAL PHARMACY W/ HCPCS (ALT 636 FOR OP/ED): Performed by: INTERNAL MEDICINE

## 2024-10-03 PROCEDURE — 93308 TTE F-UP OR LMTD: CPT

## 2024-10-03 PROCEDURE — 93308 TTE F-UP OR LMTD: CPT | Performed by: INTERNAL MEDICINE

## 2024-10-04 ENCOUNTER — TELEPHONE (OUTPATIENT)
Dept: CARDIOLOGY | Facility: HOSPITAL | Age: 64
End: 2024-10-04
Payer: COMMERCIAL

## 2024-10-04 NOTE — TELEPHONE ENCOUNTER
Phoned patient & reviewed echocardiogram results.  Patient aware to continue same dose of Camzyos 5mg (mavacamten) based on LVEF & LVOT gradient parameters. Camzyos REMs portal updated.   Patient aware of next echocardiogram date 10/30.  Patient denies symptoms of HF.  Reviewed current medications & counseled on drug to drug interactions. Instructed to call HCM clinic for any questions, clarification, or concerns.

## 2024-10-11 ENCOUNTER — APPOINTMENT (OUTPATIENT)
Dept: CARDIOLOGY | Facility: CLINIC | Age: 64
End: 2024-10-11
Payer: COMMERCIAL

## 2024-10-11 VITALS
HEIGHT: 75 IN | BODY MASS INDEX: 39.17 KG/M2 | SYSTOLIC BLOOD PRESSURE: 144 MMHG | OXYGEN SATURATION: 94 % | WEIGHT: 315 LBS | DIASTOLIC BLOOD PRESSURE: 100 MMHG | HEART RATE: 73 BPM

## 2024-10-11 DIAGNOSIS — I49.3 FREQUENT PVCS: ICD-10-CM

## 2024-10-11 DIAGNOSIS — I42.2 HYPERTROPHIC CARDIOMYOPATHY (MULTI): ICD-10-CM

## 2024-10-11 DIAGNOSIS — Z95.2 S/P AVR (AORTIC VALVE REPLACEMENT): Primary | ICD-10-CM

## 2024-10-11 DIAGNOSIS — I25.10 ARTERIOSCLEROSIS OF CORONARY ARTERY: ICD-10-CM

## 2024-10-11 DIAGNOSIS — E78.5 HYPERLIPIDEMIA, UNSPECIFIED HYPERLIPIDEMIA TYPE: ICD-10-CM

## 2024-10-11 DIAGNOSIS — I10 HYPERTENSION, UNSPECIFIED TYPE: ICD-10-CM

## 2024-10-11 DIAGNOSIS — I10 BENIGN ESSENTIAL HYPERTENSION: ICD-10-CM

## 2024-10-11 DIAGNOSIS — I35.0 NONRHEUMATIC AORTIC VALVE STENOSIS: ICD-10-CM

## 2024-10-11 DIAGNOSIS — R00.2 PALPITATIONS: ICD-10-CM

## 2024-10-11 PROCEDURE — 3061F NEG MICROALBUMINURIA REV: CPT | Performed by: INTERNAL MEDICINE

## 2024-10-11 PROCEDURE — 3080F DIAST BP >= 90 MM HG: CPT | Performed by: INTERNAL MEDICINE

## 2024-10-11 PROCEDURE — 4010F ACE/ARB THERAPY RXD/TAKEN: CPT | Performed by: INTERNAL MEDICINE

## 2024-10-11 PROCEDURE — 99214 OFFICE O/P EST MOD 30 MIN: CPT | Performed by: INTERNAL MEDICINE

## 2024-10-11 PROCEDURE — 3048F LDL-C <100 MG/DL: CPT | Performed by: INTERNAL MEDICINE

## 2024-10-11 PROCEDURE — 3008F BODY MASS INDEX DOCD: CPT | Performed by: INTERNAL MEDICINE

## 2024-10-11 PROCEDURE — 3077F SYST BP >= 140 MM HG: CPT | Performed by: INTERNAL MEDICINE

## 2024-10-11 PROCEDURE — 3044F HG A1C LEVEL LT 7.0%: CPT | Performed by: INTERNAL MEDICINE

## 2024-10-11 PROCEDURE — 1036F TOBACCO NON-USER: CPT | Performed by: INTERNAL MEDICINE

## 2024-10-11 RX ORDER — MAVACAMTEN 5 MG/1
CAPSULE, GELATIN COATED ORAL
COMMUNITY
Start: 2024-10-07

## 2024-10-11 RX ORDER — LOSARTAN POTASSIUM 100 MG/1
100 TABLET ORAL DAILY
Qty: 90 TABLET | Refills: 3 | Status: SHIPPED | OUTPATIENT
Start: 2024-10-11 | End: 2025-10-11

## 2024-10-11 NOTE — PATIENT INSTRUCTIONS
Your echocardiogram shows marked improvement in the thickening of the heart muscle.    Increase the losartan to 100 mg daily

## 2024-10-11 NOTE — PROGRESS NOTES
Subjective   Jules Neves is a 64 y.o. male.    Chief Complaint:  Follow-up hypertrophic cardiomyopathy.    HPI    He is back to work full-time.  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  He notes shortness of breath and dyspnea with exertion.  Has gained some weight.  He is not watching his diet very well.  No anginal symptoms.  No syncopal events.  Not having any side effects to medications.      In May 2023 he was referred to [facility].  His CT of the chest demonstrated a 4.7 cm ascending aortic aneurysm.  His echo showed a peak 32 mm gradient across aortic valve with a mean 23 mm gradient across the aortic valve left ventricular function was normal with a 65 to 70% ejection fraction.      He underwent surgery with aortic valve replacement and ascending aortic aneurysm surgery.    His cardiac catheterization prior to surgery demonstrated minimal coronary disease.  He did had transient neurologic symptoms after the surgery.     His cardiac history is significant for the presence of a bicuspid aortic valve. He has aortic stenosis and aortic regurgitation. His past CT of the chest  demonstrated an ascending aortic aneurysm. He also has calcifications in the distribution of the coronary artery is also seen in documented on CT scanning.     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     Allergies  Medication    · No Known Drug Allergies   Recorded By: Galilea Manrique; 3/19/2014 10:11:51 AM     Family History  Mother    · No pertinent family history     Social History  Problems    · Does not use illicit drugs (V49.89) (Z78.9)   · Former smoker (V15.82) (Z87.891)   · used cigars once in awhile. Pt. stopped in early 1-1-2014   · No alcohol use   · Occasional caffeine consumption     Review of Systems   Constitutional: Positive for malaise/fatigue.   Cardiovascular:  Positive for  "dyspnea on exertion.   Respiratory:  Positive for shortness of breath and sleep disturbances due to breathing.    Musculoskeletal:  Positive for arthritis and joint pain.      Current Outpatient Medications   Medication Sig Dispense Refill    acetaminophen (Tylenol) 325 mg tablet TAKE 2 TABLETS EVERY 6 HOURS AS NEEDED.      amLODIPine (Norvasc) 5 mg tablet Take 1 tablet (5 mg) by mouth once daily. 90 tablet 0    atorvastatin (Lipitor) 80 mg tablet Take 1 tablet (80 mg) by mouth once daily. 90 tablet 0    Camzyos 5 mg capsule       empagliflozin (Jardiance) 10 mg 1 daily      FreeStyle glucose monitoring kit Check sugar daily 1 each 0    FreeStyle Test strip 1 each once daily. 100 each 3    lancets misc Use daily 100 each 3    meloxicam (Mobic) 15 mg tablet Take 1 tablet (15 mg) by mouth once daily. 30 tablet 11    metFORMIN (Glucophage) 500 mg tablet Take 1 tablet (500 mg) by mouth 2 times a day with meals. 180 tablet 0    metoprolol tartrate (Lopressor) 50 mg tablet Take 1 tablet by mouth 2 times a day. 180 tablet 0    pantoprazole (ProtoNix) 40 mg EC tablet Take 1 tablet (40 mg) by mouth once daily in the morning. Take before meals. 90 tablet 1    True Metrix Glucose Meter misc Check sugar daily      Unilet Lancet 33 gauge misc USE AS DIRECTED ONCE DAILY      losartan (Cozaar) 100 mg tablet Take 1 tablet (100 mg) by mouth once daily. 90 tablet 3     No current facility-administered medications for this visit.        Visit Vitals  BP (!) 144/100 (BP Location: Right arm)   Pulse 73   Ht 1.905 m (6' 3\")   Wt (!) 152 kg (336 lb)   SpO2 94%   BMI 42.00 kg/m²   Smoking Status Never   BSA 2.84 m²        Objective     Constitutional:       Appearance: Not in distress.   Neck:      Vascular: JVD normal.   Pulmonary:      Breath sounds: Normal breath sounds.   Cardiovascular:      Normal rate. Regular rhythm. S1 with normal intensity. S2 with normal intensity.       Murmurs: There is no murmur.      No gallop.    Pulses:    "  Intact distal pulses.   Edema:     Peripheral edema absent.   Abdominal:      General: Bowel sounds are normal.   Neurological:      Mental Status: Alert and oriented to person, place and time.         Lab Review:   Lab Results   Component Value Date     02/15/2024    K 4.4 02/15/2024     02/15/2024    CO2 30 02/15/2024    BUN 13 02/15/2024    CREATININE 0.89 02/15/2024    GLUCOSE 108 (H) 02/15/2024    CALCIUM 9.4 02/15/2024     Lab Results   Component Value Date    CHOL 108 02/15/2024    TRIG 81 02/15/2024    HDL 38.3 02/15/2024       Assessment:    1.  Coronary artery disease.  Minimal coronary disease on the basis of cardiac catheterization.    2.  Status post aortic valve replacement.  The aortic valve is functioning normally.    3.  Status post ascending aortic aneurysm repair.  Latest echo study shows no significant abnormalities.    4.  Hypertrophic cardiomyopathy with left ventricular flow tract obstruction.  Patient is on Camzyos.  The most recent echocardiographic study shows marked improvement in the resting outflow tract gradient.  It is now down to about 10 mmHg.    5.  Hypertension.  Not well-controlled.  Will increase losartan to 100 mg daily.  Patient also needs to lose weight.

## 2024-10-13 DIAGNOSIS — I10 HYPERTENSION, UNSPECIFIED TYPE: ICD-10-CM

## 2024-10-14 RX ORDER — PANTOPRAZOLE SODIUM 40 MG/1
40 TABLET, DELAYED RELEASE ORAL
Qty: 90 TABLET | Refills: 1 | Status: SHIPPED | OUTPATIENT
Start: 2024-10-14

## 2024-10-14 RX ORDER — AMIODARONE HYDROCHLORIDE 200 MG/1
TABLET ORAL
Qty: 90 TABLET | Refills: 1 | Status: SHIPPED | OUTPATIENT
Start: 2024-10-14

## 2024-10-22 DIAGNOSIS — G47.33 OBSTRUCTIVE SLEEP APNEA SYNDROME: ICD-10-CM

## 2024-10-29 DIAGNOSIS — I10 HYPERTENSION, UNSPECIFIED TYPE: ICD-10-CM

## 2024-10-30 ENCOUNTER — TELEPHONE (OUTPATIENT)
Dept: CARDIOLOGY | Facility: HOSPITAL | Age: 64
End: 2024-10-30
Payer: COMMERCIAL

## 2024-10-30 ENCOUNTER — HOSPITAL ENCOUNTER (OUTPATIENT)
Dept: CARDIOLOGY | Facility: CLINIC | Age: 64
Discharge: HOME | End: 2024-10-30
Payer: COMMERCIAL

## 2024-10-30 DIAGNOSIS — I42.2 HYPERTROPHIC CARDIOMYOPATHY (MULTI): ICD-10-CM

## 2024-10-30 DIAGNOSIS — I42.2 HYPERTROPHIC CARDIOMYOPATHY (MULTI): Primary | ICD-10-CM

## 2024-10-30 PROCEDURE — 93308 TTE F-UP OR LMTD: CPT

## 2024-10-30 PROCEDURE — 93308 TTE F-UP OR LMTD: CPT | Performed by: INTERNAL MEDICINE

## 2024-10-30 PROCEDURE — 2500000004 HC RX 250 GENERAL PHARMACY W/ HCPCS (ALT 636 FOR OP/ED): Performed by: INTERNAL MEDICINE

## 2024-10-31 LAB
AORTIC VALVE MEAN GRADIENT: 4.6 MMHG
AORTIC VALVE PEAK VELOCITY: 1.59 M/S
AV PEAK GRADIENT: 10.1 MMHG
AVA (PEAK VEL): 3.21 CM2
AVA (VTI): 3.16 CM2
EJECTION FRACTION APICAL 4 CHAMBER: 68.6
EJECTION FRACTION: 58 %
LEFT VENTRICLE INTERNAL DIMENSION DIASTOLE: 4.33 CM (ref 3.5–6)
LEFT VENTRICULAR OUTFLOW TRACT DIAMETER: 2.4 CM

## 2024-10-31 RX ORDER — METOPROLOL TARTRATE 50 MG/1
50 TABLET ORAL 2 TIMES DAILY
Qty: 180 TABLET | Refills: 0 | Status: SHIPPED | OUTPATIENT
Start: 2024-10-31

## 2024-10-31 RX ORDER — AMLODIPINE BESYLATE 5 MG/1
5 TABLET ORAL DAILY
Qty: 90 TABLET | Refills: 0 | Status: SHIPPED | OUTPATIENT
Start: 2024-10-31

## 2024-11-08 DIAGNOSIS — I10 HYPERTENSION, UNSPECIFIED TYPE: ICD-10-CM

## 2024-11-08 RX ORDER — ATORVASTATIN CALCIUM 80 MG/1
80 TABLET, FILM COATED ORAL DAILY
Qty: 90 TABLET | Refills: 0 | Status: SHIPPED | OUTPATIENT
Start: 2024-11-08

## 2024-11-13 ENCOUNTER — TELEPHONE (OUTPATIENT)
Dept: CARDIOLOGY | Facility: HOSPITAL | Age: 64
End: 2024-11-13
Payer: COMMERCIAL

## 2024-11-13 DIAGNOSIS — I42.2 HYPERTROPHIC CARDIOMYOPATHY (MULTI): Primary | ICD-10-CM

## 2024-11-13 DIAGNOSIS — I10 BENIGN ESSENTIAL HYPERTENSION: ICD-10-CM

## 2024-11-13 RX ORDER — METOPROLOL SUCCINATE 25 MG/1
25 TABLET, EXTENDED RELEASE ORAL DAILY
Qty: 30 TABLET | Refills: 11 | Status: SHIPPED | OUTPATIENT
Start: 2024-11-13 | End: 2024-11-14 | Stop reason: DRUGHIGH

## 2024-11-13 NOTE — TELEPHONE ENCOUNTER
Patient phones [facility] & leaves voicemail that he is experiencing episodes of transient dizziness.  Discussed with Dr. Harrison.  Since patient gradients are drastically reduced on mavacamten, patient can decrease his metoprolol to 25mg twice daily.  Attempted to phone patient to advise, but he had a work emergency & will call us back.

## 2024-11-14 RX ORDER — METOPROLOL TARTRATE 50 MG/1
25 TABLET ORAL
COMMUNITY

## 2024-11-14 NOTE — TELEPHONE ENCOUNTER
Patient returns call.  Reviewed home medications.  Patient has been taking metoprolol tartrate 50mg daily instead of twice daily.  Instructed patient to take half of that dose (25mg daily) & report back if dizziness  & fatigue do not resolve

## 2025-01-05 DIAGNOSIS — M19.90 OSTEOARTHRITIS, UNSPECIFIED OSTEOARTHRITIS TYPE, UNSPECIFIED SITE: ICD-10-CM

## 2025-01-06 RX ORDER — MELOXICAM 15 MG/1
15 TABLET ORAL DAILY
Qty: 30 TABLET | Refills: 11 | Status: SHIPPED | OUTPATIENT
Start: 2025-01-06 | End: 2026-01-06

## 2025-01-09 ENCOUNTER — LAB (OUTPATIENT)
Dept: LAB | Facility: LAB | Age: 65
End: 2025-01-09
Payer: COMMERCIAL

## 2025-01-09 ENCOUNTER — OFFICE VISIT (OUTPATIENT)
Dept: PRIMARY CARE | Facility: CLINIC | Age: 65
End: 2025-01-09
Payer: COMMERCIAL

## 2025-01-09 VITALS — SYSTOLIC BLOOD PRESSURE: 145 MMHG | HEART RATE: 82 BPM | OXYGEN SATURATION: 94 % | DIASTOLIC BLOOD PRESSURE: 94 MMHG

## 2025-01-09 DIAGNOSIS — E11.9 CONTROLLED TYPE 2 DIABETES MELLITUS WITHOUT COMPLICATION, WITHOUT LONG-TERM CURRENT USE OF INSULIN (MULTI): ICD-10-CM

## 2025-01-09 DIAGNOSIS — E11.9 CONTROLLED TYPE 2 DIABETES MELLITUS WITHOUT COMPLICATION, WITHOUT LONG-TERM CURRENT USE OF INSULIN (MULTI): Primary | ICD-10-CM

## 2025-01-09 PROCEDURE — 3077F SYST BP >= 140 MM HG: CPT | Performed by: FAMILY MEDICINE

## 2025-01-09 PROCEDURE — 3080F DIAST BP >= 90 MM HG: CPT | Performed by: FAMILY MEDICINE

## 2025-01-09 PROCEDURE — 4010F ACE/ARB THERAPY RXD/TAKEN: CPT | Performed by: FAMILY MEDICINE

## 2025-01-09 PROCEDURE — 80053 COMPREHEN METABOLIC PANEL: CPT

## 2025-01-09 PROCEDURE — 83036 HEMOGLOBIN GLYCOSYLATED A1C: CPT

## 2025-01-09 PROCEDURE — 99214 OFFICE O/P EST MOD 30 MIN: CPT | Performed by: FAMILY MEDICINE

## 2025-01-09 PROCEDURE — 80061 LIPID PANEL: CPT

## 2025-01-09 RX ORDER — AMOXICILLIN 875 MG/1
875 TABLET, FILM COATED ORAL 2 TIMES DAILY
Qty: 20 TABLET | Refills: 0 | Status: SHIPPED | OUTPATIENT
Start: 2025-01-09 | End: 2025-01-19

## 2025-01-09 ASSESSMENT — ENCOUNTER SYMPTOMS
CARDIOVASCULAR NEGATIVE: 1
MUSCULOSKELETAL NEGATIVE: 1
CONSTITUTIONAL NEGATIVE: 1
NEUROLOGICAL NEGATIVE: 1
RESPIRATORY NEGATIVE: 1

## 2025-01-09 NOTE — PROGRESS NOTES
Subjective   Patient ID: Jules Neves is a 64 y.o. male who presents for Sinusitis.  Sinusitis      Ha facial pain pnd  Review of Systems   Constitutional: Negative.    HENT: Negative.     Respiratory: Negative.     Cardiovascular: Negative.    Genitourinary: Negative.    Musculoskeletal: Negative.    Neurological: Negative.        Objective   Physical Exam  Constitutional:       Appearance: Normal appearance.   HENT:      Head: Normocephalic.      Nose: Congestion and rhinorrhea present.      Mouth/Throat:      Mouth: Mucous membranes are moist.   Eyes:      Pupils: Pupils are equal, round, and reactive to light.   Cardiovascular:      Rate and Rhythm: Normal rate and regular rhythm.   Pulmonary:      Effort: Pulmonary effort is normal.   Abdominal:      General: Abdomen is flat.   Musculoskeletal:         General: Normal range of motion.   Neurological:      Mental Status: He is alert.         Assessment/Plan   Problem List Items Addressed This Visit    None  Visit Diagnoses         Codes    Controlled type 2 diabetes mellitus without complication, without long-term current use of insulin (Multi)    -  Primary E11.9    Relevant Medications    amoxicillin (Amoxil) 875 mg tablet    Other Relevant Orders    Comprehensive Metabolic Panel    Lipid Panel    Hemoglobin A1C                 Prieto Orozco DO 01/09/25 1:39 PM

## 2025-01-10 LAB
ALBUMIN SERPL BCP-MCNC: 4.5 G/DL (ref 3.4–5)
ALP SERPL-CCNC: 74 U/L (ref 33–136)
ALT SERPL W P-5'-P-CCNC: 42 U/L (ref 10–52)
ANION GAP SERPL CALC-SCNC: 18 MMOL/L (ref 10–20)
AST SERPL W P-5'-P-CCNC: 44 U/L (ref 9–39)
BILIRUB SERPL-MCNC: 0.9 MG/DL (ref 0–1.2)
BUN SERPL-MCNC: 15 MG/DL (ref 6–23)
CALCIUM SERPL-MCNC: 9.7 MG/DL (ref 8.6–10.6)
CHLORIDE SERPL-SCNC: 102 MMOL/L (ref 98–107)
CHOLEST SERPL-MCNC: 129 MG/DL (ref 0–199)
CHOLESTEROL/HDL RATIO: 4.1
CO2 SERPL-SCNC: 24 MMOL/L (ref 21–32)
CREAT SERPL-MCNC: 0.9 MG/DL (ref 0.5–1.3)
EGFRCR SERPLBLD CKD-EPI 2021: >90 ML/MIN/1.73M*2
EST. AVERAGE GLUCOSE BLD GHB EST-MCNC: 160 MG/DL
GLUCOSE SERPL-MCNC: 186 MG/DL (ref 74–99)
HBA1C MFR BLD: 7.2 %
HDLC SERPL-MCNC: 31.1 MG/DL
LDLC SERPL CALC-MCNC: 65 MG/DL
NON HDL CHOLESTEROL: 98 MG/DL (ref 0–149)
POTASSIUM SERPL-SCNC: 4.1 MMOL/L (ref 3.5–5.3)
PROT SERPL-MCNC: 7.8 G/DL (ref 6.4–8.2)
SODIUM SERPL-SCNC: 140 MMOL/L (ref 136–145)
TRIGL SERPL-MCNC: 167 MG/DL (ref 0–149)
VLDL: 33 MG/DL (ref 0–40)

## 2025-01-20 ENCOUNTER — TELEPHONE (OUTPATIENT)
Dept: CARDIOLOGY | Facility: HOSPITAL | Age: 65
End: 2025-01-20
Payer: COMMERCIAL

## 2025-01-20 ENCOUNTER — HOSPITAL ENCOUNTER (OUTPATIENT)
Dept: CARDIOLOGY | Facility: CLINIC | Age: 65
Discharge: HOME | End: 2025-01-20
Payer: COMMERCIAL

## 2025-01-20 DIAGNOSIS — I42.2 HYPERTROPHIC CARDIOMYOPATHY (MULTI): ICD-10-CM

## 2025-01-20 DIAGNOSIS — I42.2 HYPERTROPHIC CARDIOMYOPATHY (MULTI): Primary | ICD-10-CM

## 2025-01-20 PROCEDURE — C8924 2D TTE W OR W/O FOL W/CON,FU: HCPCS

## 2025-01-20 PROCEDURE — 93308 TTE F-UP OR LMTD: CPT | Performed by: INTERNAL MEDICINE

## 2025-01-20 PROCEDURE — 2500000004 HC RX 250 GENERAL PHARMACY W/ HCPCS (ALT 636 FOR OP/ED): Performed by: INTERNAL MEDICINE

## 2025-01-20 RX ADMIN — PERFLUTREN 2 ML OF DILUTION: 6.52 INJECTION, SUSPENSION INTRAVENOUS at 10:45

## 2025-01-20 NOTE — TELEPHONE ENCOUNTER
Phoned patient & reviewed echocardiogram results.  Patient aware to continue same dose of Camzyos 10mg (mavacamten) based on LVEF & LVOT gradient parameters. Camzyos REMs portal updated.   Patient aware of next echocardiogram date 4/11/25.  Patient denies symptoms of HF.  Reviewed current medications & counseled on drug to drug interactions. Instructed to call HCM clinic for any questions, clarification, or concerns.

## 2025-01-23 LAB
AORTIC VALVE MEAN GRADIENT: 4 MMHG
AORTIC VALVE PEAK VELOCITY: 1.4 M/S
AV PEAK GRADIENT: 8 MMHG
AVA (PEAK VEL): 2.68 CM2
AVA (VTI): 2.69 CM2
EJECTION FRACTION APICAL 4 CHAMBER: 66.4
EJECTION FRACTION: 60 %
LEFT VENTRICLE INTERNAL DIMENSION DIASTOLE: 4.47 CM (ref 3.5–6)
LEFT VENTRICULAR OUTFLOW TRACT DIAMETER: 2.5 CM
MITRAL VALVE E/A RATIO: 0.8

## 2025-01-28 DIAGNOSIS — E11.69 TYPE 2 DIABETES MELLITUS WITH OTHER SPECIFIED COMPLICATION, WITHOUT LONG-TERM CURRENT USE OF INSULIN: Primary | ICD-10-CM

## 2025-02-08 DIAGNOSIS — I10 HYPERTENSION, UNSPECIFIED TYPE: ICD-10-CM

## 2025-02-08 DIAGNOSIS — E11.69 TYPE 2 DIABETES MELLITUS WITH OTHER SPECIFIED COMPLICATION, WITHOUT LONG-TERM CURRENT USE OF INSULIN: ICD-10-CM

## 2025-02-10 RX ORDER — AMLODIPINE BESYLATE 5 MG/1
5 TABLET ORAL DAILY
Qty: 90 TABLET | Refills: 0 | Status: SHIPPED | OUTPATIENT
Start: 2025-02-10

## 2025-02-10 RX ORDER — METFORMIN HYDROCHLORIDE 500 MG/1
500 TABLET ORAL
Qty: 180 TABLET | Refills: 0 | Status: SHIPPED | OUTPATIENT
Start: 2025-02-10

## 2025-02-10 RX ORDER — ATORVASTATIN CALCIUM 80 MG/1
80 TABLET, FILM COATED ORAL DAILY
Qty: 90 TABLET | Refills: 0 | Status: SHIPPED | OUTPATIENT
Start: 2025-02-10

## 2025-02-24 NOTE — PROGRESS NOTES
Jules returns for follow up after recent echo 1/20/25 sttus post 8/28/23 biobental avr root asc 27mm surgery. Nikki Ross RN

## 2025-02-26 ENCOUNTER — OFFICE VISIT (OUTPATIENT)
Dept: CARDIAC SURGERY | Facility: HOSPITAL | Age: 65
End: 2025-02-26
Payer: COMMERCIAL

## 2025-02-26 VITALS
HEART RATE: 88 BPM | BODY MASS INDEX: 39.17 KG/M2 | SYSTOLIC BLOOD PRESSURE: 165 MMHG | OXYGEN SATURATION: 95 % | HEIGHT: 75 IN | DIASTOLIC BLOOD PRESSURE: 81 MMHG | WEIGHT: 315 LBS

## 2025-02-26 DIAGNOSIS — Z95.2 S/P AVR (AORTIC VALVE REPLACEMENT): ICD-10-CM

## 2025-02-26 DIAGNOSIS — Z95.828 S/P ASCENDING AORTIC REPLACEMENT: ICD-10-CM

## 2025-02-26 PROCEDURE — 99214 OFFICE O/P EST MOD 30 MIN: CPT | Performed by: THORACIC SURGERY (CARDIOTHORACIC VASCULAR SURGERY)

## 2025-02-26 PROCEDURE — 3008F BODY MASS INDEX DOCD: CPT | Performed by: THORACIC SURGERY (CARDIOTHORACIC VASCULAR SURGERY)

## 2025-02-26 PROCEDURE — 3051F HG A1C>EQUAL 7.0%<8.0%: CPT | Performed by: THORACIC SURGERY (CARDIOTHORACIC VASCULAR SURGERY)

## 2025-02-26 PROCEDURE — 4010F ACE/ARB THERAPY RXD/TAKEN: CPT | Performed by: THORACIC SURGERY (CARDIOTHORACIC VASCULAR SURGERY)

## 2025-02-26 PROCEDURE — 3048F LDL-C <100 MG/DL: CPT | Performed by: THORACIC SURGERY (CARDIOTHORACIC VASCULAR SURGERY)

## 2025-02-26 PROCEDURE — 3079F DIAST BP 80-89 MM HG: CPT | Performed by: THORACIC SURGERY (CARDIOTHORACIC VASCULAR SURGERY)

## 2025-02-26 PROCEDURE — 3077F SYST BP >= 140 MM HG: CPT | Performed by: THORACIC SURGERY (CARDIOTHORACIC VASCULAR SURGERY)

## 2025-02-26 ASSESSMENT — PAIN SCALES - GENERAL: PAINLEVEL_OUTOF10: 6

## 2025-02-26 ASSESSMENT — ENCOUNTER SYMPTOMS
OCCASIONAL FEELINGS OF UNSTEADINESS: 1
DEPRESSION: 0
LOSS OF SENSATION IN FEET: 0

## 2025-02-26 ASSESSMENT — PATIENT HEALTH QUESTIONNAIRE - PHQ9
1. LITTLE INTEREST OR PLEASURE IN DOING THINGS: NOT AT ALL
2. FEELING DOWN, DEPRESSED OR HOPELESS: NOT AT ALL
SUM OF ALL RESPONSES TO PHQ9 QUESTIONS 1 AND 2: 0

## 2025-02-27 DIAGNOSIS — Z95.2 STATUS POST AORTIC VALVE REPLACEMENT: ICD-10-CM

## 2025-03-13 DIAGNOSIS — I42.2 HYPERTROPHIC CARDIOMYOPATHY (MULTI): Primary | ICD-10-CM

## 2025-03-13 RX ORDER — MAVACAMTEN 5 MG/1
1 CAPSULE, GELATIN COATED ORAL DAILY
Qty: 30 CAPSULE | Refills: 6 | Status: SHIPPED | OUTPATIENT
Start: 2025-03-13

## 2025-03-14 ENCOUNTER — PATIENT OUTREACH (OUTPATIENT)
Dept: CARE COORDINATION | Age: 65
End: 2025-03-14
Payer: COMMERCIAL

## 2025-03-14 DIAGNOSIS — E11.69 TYPE 2 DIABETES MELLITUS WITH OTHER SPECIFIED COMPLICATION, WITHOUT LONG-TERM CURRENT USE OF INSULIN: Primary | ICD-10-CM

## 2025-03-19 ENCOUNTER — PATIENT OUTREACH (OUTPATIENT)
Dept: CARE COORDINATION | Age: 65
End: 2025-03-19
Payer: COMMERCIAL

## 2025-03-19 NOTE — PROGRESS NOTES
Attempted call for call schedule and equipment setup instructions, no answer, LM on VM. Mary Hudson RN

## 2025-03-21 PROBLEM — E11.9 DIABETES MELLITUS, TYPE 2 (MULTI): Status: ACTIVE | Noted: 2023-09-26

## 2025-03-22 ENCOUNTER — PATIENT OUTREACH (OUTPATIENT)
Dept: CARE COORDINATION | Age: 65
End: 2025-03-22
Payer: COMMERCIAL

## 2025-03-22 ENCOUNTER — DOCUMENTATION (OUTPATIENT)
Dept: CARE COORDINATION | Age: 65
End: 2025-03-22
Payer: COMMERCIAL

## 2025-03-23 VITALS — SYSTOLIC BLOOD PRESSURE: 164 MMHG | DIASTOLIC BLOOD PRESSURE: 106 MMHG | HEART RATE: 84 BPM

## 2025-03-23 NOTE — PROGRESS NOTES
Called pt to discuss elevated diastolic bp he  gets lighteaded once in a while has taken all meds nothing new retested bp now 106/106 machine is showing an error code  pt takes his meds in the [place] will check bp reading in am will go to work in am home by 4:15  Team will follow pt tomorrow

## 2025-03-24 ENCOUNTER — PATIENT OUTREACH (OUTPATIENT)
Dept: CARE COORDINATION | Age: 65
End: 2025-03-24
Payer: COMMERCIAL

## 2025-03-24 VITALS — DIASTOLIC BLOOD PRESSURE: 106 MMHG | SYSTOLIC BLOOD PRESSURE: 159 MMHG | HEART RATE: 95 BPM

## 2025-03-25 ENCOUNTER — PATIENT OUTREACH (OUTPATIENT)
Dept: CARE COORDINATION | Age: 65
End: 2025-03-25
Payer: COMMERCIAL

## 2025-03-25 VITALS — DIASTOLIC BLOOD PRESSURE: 104 MMHG | HEART RATE: 85 BPM | SYSTOLIC BLOOD PRESSURE: 142 MMHG

## 2025-03-25 NOTE — PROGRESS NOTES
Spoke to patient, first call, reviewed providers, vaccination status, medication classification, and medical history. Reviewed program goals and set goals for 12 wk program, BP elevated. MD notified via secure chat. Will continue to monitor.

## 2025-03-26 ENCOUNTER — PATIENT OUTREACH (OUTPATIENT)
Dept: CARE COORDINATION | Age: 65
End: 2025-03-26
Payer: COMMERCIAL

## 2025-03-26 VITALS — SYSTOLIC BLOOD PRESSURE: 145 MMHG | DIASTOLIC BLOOD PRESSURE: 104 MMHG | HEART RATE: 93 BPM

## 2025-03-27 ENCOUNTER — PATIENT OUTREACH (OUTPATIENT)
Dept: CARE COORDINATION | Age: 65
End: 2025-03-27
Payer: COMMERCIAL

## 2025-03-27 ENCOUNTER — DOCUMENTATION (OUTPATIENT)
Dept: CARE COORDINATION | Age: 65
End: 2025-03-27
Payer: COMMERCIAL

## 2025-03-27 NOTE — PROGRESS NOTES
MD notified again of pts elevated BP readings via secure chat. No response as of yet. Will continue to monitor.

## 2025-03-29 ENCOUNTER — PATIENT OUTREACH (OUTPATIENT)
Dept: CARE COORDINATION | Age: 65
End: 2025-03-29
Payer: COMMERCIAL

## 2025-03-29 VITALS — SYSTOLIC BLOOD PRESSURE: 148 MMHG | HEART RATE: 88 BPM | DIASTOLIC BLOOD PRESSURE: 98 MMHG

## 2025-03-29 VITALS — DIASTOLIC BLOOD PRESSURE: 98 MMHG | SYSTOLIC BLOOD PRESSURE: 148 MMHG | HEART RATE: 98 BPM

## 2025-03-29 NOTE — PROGRESS NOTES
Called pt about his elevated bp  the elevated bp reading was earlier this am  has since taken meds he will retest  bp now and will see how bp reading is  then

## 2025-03-29 NOTE — PROGRESS NOTES
Called pt again today used his own bp   machine  was still high 145/95  had dizziness in m now gone  suggested he may need to go to ER refused   said  he has an appointment with Dr Harrison  on 4/11/25  and scheduled for an echocardiogram He will rest  now  I will follow pt in the am

## 2025-03-29 NOTE — PROGRESS NOTES
Called pt about continuing elevated bp radongs  machine on left message to take meds and retest bp later todat when I will follow up   The doctor was  notified of elevated bp   yesterday  by  nurse Hernandez  still waiting for a response

## 2025-03-30 ENCOUNTER — PATIENT OUTREACH (OUTPATIENT)
Dept: CARE COORDINATION | Age: 65
End: 2025-03-30
Payer: COMMERCIAL

## 2025-03-30 VITALS — SYSTOLIC BLOOD PRESSURE: 145 MMHG | HEART RATE: 99 BPM | DIASTOLIC BLOOD PRESSURE: 101 MMHG

## 2025-03-30 NOTE — PROGRESS NOTES
Called pt he is feeling better  not dizzy today  but bp remains elevated  took again with his machine 146/95  pulse 87 pt is going on my chart to make appointment with Dr Orozco and is sending his cardiologist an email about bp and is scheduled to see Dr Harrison his cardiologist on April 11  Will continue to monitor   
0 = swallows foods/liquids without difficulty

## 2025-04-01 ENCOUNTER — PATIENT OUTREACH (OUTPATIENT)
Dept: CARE COORDINATION | Age: 65
End: 2025-04-01
Payer: COMMERCIAL

## 2025-04-03 ENCOUNTER — PATIENT OUTREACH (OUTPATIENT)
Dept: CARE COORDINATION | Age: 65
End: 2025-04-03

## 2025-04-03 ENCOUNTER — APPOINTMENT (OUTPATIENT)
Dept: PRIMARY CARE | Facility: CLINIC | Age: 65
End: 2025-04-03
Payer: COMMERCIAL

## 2025-04-03 VITALS
BODY MASS INDEX: 39.17 KG/M2 | WEIGHT: 315 LBS | HEART RATE: 95 BPM | HEIGHT: 75 IN | DIASTOLIC BLOOD PRESSURE: 85 MMHG | SYSTOLIC BLOOD PRESSURE: 123 MMHG | OXYGEN SATURATION: 94 %

## 2025-04-03 DIAGNOSIS — I10 HYPERTENSION, UNSPECIFIED TYPE: ICD-10-CM

## 2025-04-03 DIAGNOSIS — E11.9 CONTROLLED TYPE 2 DIABETES MELLITUS WITHOUT COMPLICATION, WITHOUT LONG-TERM CURRENT USE OF INSULIN: Primary | ICD-10-CM

## 2025-04-03 PROCEDURE — 3008F BODY MASS INDEX DOCD: CPT | Performed by: FAMILY MEDICINE

## 2025-04-03 PROCEDURE — 3048F LDL-C <100 MG/DL: CPT | Performed by: FAMILY MEDICINE

## 2025-04-03 PROCEDURE — 3079F DIAST BP 80-89 MM HG: CPT | Performed by: FAMILY MEDICINE

## 2025-04-03 PROCEDURE — 4010F ACE/ARB THERAPY RXD/TAKEN: CPT | Performed by: FAMILY MEDICINE

## 2025-04-03 PROCEDURE — 3051F HG A1C>EQUAL 7.0%<8.0%: CPT | Performed by: FAMILY MEDICINE

## 2025-04-03 PROCEDURE — 99214 OFFICE O/P EST MOD 30 MIN: CPT | Performed by: FAMILY MEDICINE

## 2025-04-03 PROCEDURE — 3074F SYST BP LT 130 MM HG: CPT | Performed by: FAMILY MEDICINE

## 2025-04-03 ASSESSMENT — ENCOUNTER SYMPTOMS
GASTROINTESTINAL NEGATIVE: 1
RESPIRATORY NEGATIVE: 1
CONSTITUTIONAL NEGATIVE: 1
HYPERTENSION: 1
CARDIOVASCULAR NEGATIVE: 1

## 2025-04-03 NOTE — PROGRESS NOTES
Subjective   Patient ID: Jules Neves is a 64 y.o. male who presents for Hypertension.  Hypertension      Hx htn no acute process  Bp been queta at home   Will incr metoprolol to 50 fu in6 weeks      Review of Systems   Constitutional: Negative.    HENT: Negative.     Respiratory: Negative.     Cardiovascular: Negative.    Gastrointestinal: Negative.    Genitourinary: Negative.        Objective   Physical Exam  Constitutional:       Appearance: Normal appearance.   HENT:      Head: Normocephalic and atraumatic.      Mouth/Throat:      Mouth: Mucous membranes are moist.   Eyes:      Extraocular Movements: Extraocular movements intact.      Pupils: Pupils are equal, round, and reactive to light.   Cardiovascular:      Rate and Rhythm: Normal rate and regular rhythm.   Pulmonary:      Effort: Pulmonary effort is normal.      Breath sounds: Normal breath sounds.   Musculoskeletal:         General: Normal range of motion.   Skin:     General: Skin is warm and dry.   Neurological:      Mental Status: He is alert.         Assessment/Plan            Prieto Orozco DO 04/03/25 8:52 AM

## 2025-04-04 ENCOUNTER — PATIENT OUTREACH (OUTPATIENT)
Dept: CARE COORDINATION | Age: 65
End: 2025-04-04
Payer: COMMERCIAL

## 2025-04-04 NOTE — PROGRESS NOTES
Spoke to patient, first call, reviewed providers, vaccination status, medication classification, and medical history. Reviewed program goals and set goals for 12 wk program, MONIQUE JERNIGAN Will continue to monitor.

## 2025-04-05 ENCOUNTER — PATIENT OUTREACH (OUTPATIENT)
Dept: CARE COORDINATION | Age: 65
End: 2025-04-05
Payer: COMMERCIAL

## 2025-04-05 VITALS — SYSTOLIC BLOOD PRESSURE: 151 MMHG | HEART RATE: 80 BPM | DIASTOLIC BLOOD PRESSURE: 95 MMHG

## 2025-04-05 NOTE — PROGRESS NOTES
Called to discuss elevated bp no answer unable to leave message  will attempt to call later   Will continue to monitor

## 2025-04-06 ENCOUNTER — PATIENT OUTREACH (OUTPATIENT)
Dept: CARE COORDINATION | Age: 65
End: 2025-04-06
Payer: COMMERCIAL

## 2025-04-06 ENCOUNTER — DOCUMENTATION (OUTPATIENT)
Dept: CARE COORDINATION | Age: 65
End: 2025-04-06
Payer: COMMERCIAL

## 2025-04-06 NOTE — PROGRESS NOTES
Called pr about bp has not taken meds yes having dinner will take his meds will do and retest bp after  states he feels ok   Will continue to monitor

## 2025-04-08 ENCOUNTER — PATIENT OUTREACH (OUTPATIENT)
Dept: CARE COORDINATION | Age: 65
End: 2025-04-08
Payer: COMMERCIAL

## 2025-04-10 ENCOUNTER — PATIENT OUTREACH (OUTPATIENT)
Dept: CARE COORDINATION | Age: 65
End: 2025-04-10
Payer: COMMERCIAL

## 2025-04-10 NOTE — PROGRESS NOTES
Spoke to patient. Discussed cardiac circulation, disease processes, Hole in heart, oxygenated side, valves, purpose/murmurs/varicose veins, Arteries need to be stretchable, Aneurysm, smoking/salt/sea salt/pink him salt, Plaque - HTN, calcium scoring, Statins, BP Meds, blood thinners, CAD, Angina, NTG, MI , Stints, CABG, CVA, Effects of Salt/Water, Edema,  discussed medications of anti-hypertensives, diuretics, statins, . Pt voiced understanding, Sent Educational Materials. Mary Hudson RN

## 2025-04-11 ENCOUNTER — HOSPITAL ENCOUNTER (OUTPATIENT)
Dept: CARDIOLOGY | Facility: CLINIC | Age: 65
Discharge: HOME | End: 2025-04-11
Payer: COMMERCIAL

## 2025-04-11 ENCOUNTER — APPOINTMENT (OUTPATIENT)
Dept: CARDIOLOGY | Facility: CLINIC | Age: 65
End: 2025-04-11
Payer: COMMERCIAL

## 2025-04-11 VITALS
OXYGEN SATURATION: 95 % | HEIGHT: 75 IN | SYSTOLIC BLOOD PRESSURE: 140 MMHG | HEART RATE: 74 BPM | WEIGHT: 315 LBS | BODY MASS INDEX: 39.17 KG/M2 | DIASTOLIC BLOOD PRESSURE: 80 MMHG

## 2025-04-11 DIAGNOSIS — I35.0 NONRHEUMATIC AORTIC VALVE STENOSIS: ICD-10-CM

## 2025-04-11 DIAGNOSIS — I25.10 ARTERIOSCLEROSIS OF CORONARY ARTERY: ICD-10-CM

## 2025-04-11 DIAGNOSIS — I42.2 HYPERTROPHIC CARDIOMYOPATHY (MULTI): Primary | ICD-10-CM

## 2025-04-11 DIAGNOSIS — Z95.2 S/P AVR (AORTIC VALVE REPLACEMENT): ICD-10-CM

## 2025-04-11 DIAGNOSIS — I10 BENIGN ESSENTIAL HYPERTENSION: Primary | ICD-10-CM

## 2025-04-11 DIAGNOSIS — I42.2 HYPERTROPHIC CARDIOMYOPATHY (MULTI): ICD-10-CM

## 2025-04-11 DIAGNOSIS — Z95.828 S/P ASCENDING AORTIC REPLACEMENT: ICD-10-CM

## 2025-04-11 DIAGNOSIS — Q24.8 LEFT VENTRICULAR OUTFLOW OBSTRUCTION (HHS-HCC): ICD-10-CM

## 2025-04-11 DIAGNOSIS — I50.32 CHRONIC DIASTOLIC (CONGESTIVE) HEART FAILURE: ICD-10-CM

## 2025-04-11 DIAGNOSIS — E78.5 HYPERLIPIDEMIA, UNSPECIFIED HYPERLIPIDEMIA TYPE: ICD-10-CM

## 2025-04-11 DIAGNOSIS — I49.3 FREQUENT PVCS: ICD-10-CM

## 2025-04-11 DIAGNOSIS — R00.2 PALPITATIONS: ICD-10-CM

## 2025-04-11 LAB
ATRIAL RATE: 74 BPM
BODY SURFACE AREA: 2.82 M2
P AXIS: 43 DEGREES
P OFFSET: 144 MS
P ONSET: 102 MS
PR INTERVAL: 234 MS
Q ONSET: 219 MS
QRS COUNT: 12 BEATS
QRS DURATION: 98 MS
QT INTERVAL: 408 MS
QTC CALCULATION(BAZETT): 452 MS
QTC FREDERICIA: 437 MS
R AXIS: 12 DEGREES
T AXIS: 75 DEGREES
T OFFSET: 423 MS
VENTRICULAR RATE: 74 BPM

## 2025-04-11 PROCEDURE — 2500000004 HC RX 250 GENERAL PHARMACY W/ HCPCS (ALT 636 FOR OP/ED): Performed by: INTERNAL MEDICINE

## 2025-04-11 PROCEDURE — 93005 ELECTROCARDIOGRAM TRACING: CPT | Performed by: INTERNAL MEDICINE

## 2025-04-11 PROCEDURE — 3048F LDL-C <100 MG/DL: CPT | Performed by: INTERNAL MEDICINE

## 2025-04-11 PROCEDURE — 3079F DIAST BP 80-89 MM HG: CPT | Performed by: INTERNAL MEDICINE

## 2025-04-11 PROCEDURE — 4010F ACE/ARB THERAPY RXD/TAKEN: CPT | Performed by: INTERNAL MEDICINE

## 2025-04-11 PROCEDURE — 3008F BODY MASS INDEX DOCD: CPT | Performed by: INTERNAL MEDICINE

## 2025-04-11 PROCEDURE — 99214 OFFICE O/P EST MOD 30 MIN: CPT | Performed by: INTERNAL MEDICINE

## 2025-04-11 PROCEDURE — C8924 2D TTE W OR W/O FOL W/CON,FU: HCPCS

## 2025-04-11 PROCEDURE — 3077F SYST BP >= 140 MM HG: CPT | Performed by: INTERNAL MEDICINE

## 2025-04-11 PROCEDURE — 3051F HG A1C>EQUAL 7.0%<8.0%: CPT | Performed by: INTERNAL MEDICINE

## 2025-04-11 RX ORDER — TORSEMIDE 5 MG/1
5 TABLET ORAL DAILY
Qty: 90 TABLET | Refills: 3 | Status: SHIPPED | OUTPATIENT
Start: 2025-04-11 | End: 2026-04-11

## 2025-04-11 RX ADMIN — PERFLUTREN 3 ML OF DILUTION: 6.52 INJECTION, SUSPENSION INTRAVENOUS at 11:13

## 2025-04-11 NOTE — PATIENT INSTRUCTIONS
In addition to your other medications, start torsemide 5 mg daily.    Get blood work in two weeks.

## 2025-04-11 NOTE — PROGRESS NOTES
Subjective   Jules Neves is a 64 y.o. male.    Chief Complaint:  Follow-up hypertrophic cardiomyopathy aortic valve replacement coronary artery disease.    HPI    His chief complaint is that of shortness of breath and dyspnea on exertion.  Even mild tasks such as walking across room will cause him to get short of breath he is struggling at work to be able to do his job.  No anginal symptoms.  No syncopal or near syncopal events.  Has a lot of arthritis particularly in the knees and hips.    In May 2023 he was referred to [facility].  His CT of the chest demonstrated a 4.7 cm ascending aortic aneurysm.  His echo showed a peak 32 mm gradient across aortic valve with a mean 23 mm gradient across the aortic valve left ventricular function was normal with a 65 to 70% ejection fraction.      He underwent surgery with aortic valve replacement and ascending aortic aneurysm surgery.    His cardiac catheterization prior to surgery demonstrated minimal coronary disease.  He did had transient neurologic symptoms after the surgery.     His cardiac history is significant for the presence of a bicuspid aortic valve. He has aortic stenosis and aortic regurgitation. His past CT of the chest  demonstrated an ascending aortic aneurysm. He also has calcifications in the distribution of the coronary artery is also seen in documented on CT scanning.     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     Allergies  Medication    · No Known Drug Allergies     Family History  Mother    · No pertinent family history     Social History  Problems     · Former smoker (V15.82) (Z87.891)   · used cigars once in awhile. Pt. stopped in early 1-1-2014   · No alcohol use   · Occasional caffeine consumption     Review of Systems   Constitutional: Positive for malaise/fatigue.   Cardiovascular:  Positive for dyspnea on exertion.   Respiratory:  Positive for shortness of breath and  "sleep disturbances due to breathing.    Musculoskeletal:  Positive for arthritis and joint pain.     Current Outpatient Medications   Medication Sig Dispense Refill    acetaminophen (Tylenol) 325 mg tablet TAKE 2 TABLETS EVERY 6 HOURS AS NEEDED.      amiodarone (Pacerone) 200 mg tablet TAKE 1 TABLET (200mg) BY MOUTH ONCE DAILY for health maintenance 90 tablet 1    amLODIPine (Norvasc) 5 mg tablet Take 1 tablet (5 mg) by mouth once daily. 90 tablet 0    atorvastatin (Lipitor) 80 mg tablet Take 1 tablet (80 mg) by mouth once daily. 90 tablet 0    empagliflozin (Jardiance) 10 mg 1 daily      FreeStyle Test strip 1 each once daily. 100 each 3    lancets misc Use daily 100 each 3    losartan (Cozaar) 100 mg tablet Take 1 tablet (100 mg) by mouth once daily. 90 tablet 3    mavacamten (Camzyos) 5 mg capsule TAKE 1 CAPSULE BY MOUTH DAILY 30 capsule 6    meloxicam (Mobic) 15 mg tablet Take 1 tablet (15 mg) by mouth once daily. 30 tablet 11    metFORMIN (Glucophage) 500 mg tablet Take 1 tablet (500 mg) by mouth 2 times a day with meals. 180 tablet 0    metoprolol tartrate (Lopressor) 50 mg tablet Take 0.5 tablets by mouth.      pantoprazole (ProtoNix) 40 mg EC tablet Take 1 tablet (40 mg) by mouth once daily in the morning. Take before meals. 90 tablet 1    torsemide (Demadex) 5 mg tablet Take 1 tablet (5 mg) by mouth once daily. 90 tablet 3    True Metrix Glucose Meter misc Check sugar daily      Unilet Lancet 33 gauge misc USE AS DIRECTED ONCE DAILY       No current facility-administered medications for this visit.        Visit Vitals  /80   Pulse 74   Ht 1.905 m (6' 3\")   Wt (!) 151 kg (332 lb)   SpO2 95%   BMI 41.50 kg/m²   Smoking Status Never   BSA 2.83 m²        Objective     Constitutional:       Appearance: Not in distress.   Neck:      Vascular: JVD normal.   Pulmonary:      Breath sounds: Normal breath sounds.   Cardiovascular:      Normal rate. Regular rhythm. S1 with normal intensity. S2 with normal " intensity.       Murmurs: There is no murmur.      No gallop.    Pulses:     Intact distal pulses.   Edema:     Peripheral edema absent.   Abdominal:      General: Bowel sounds are normal.   Neurological:      Mental Status: Alert and oriented to person, place and time.         Lab Review:   Lab Results   Component Value Date     01/09/2025    K 4.1 01/09/2025     01/09/2025    CO2 24 01/09/2025    BUN 15 01/09/2025    CREATININE 0.90 01/09/2025    GLUCOSE 186 (H) 01/09/2025    CALCIUM 9.7 01/09/2025     Lab Results   Component Value Date    CHOL 129 01/09/2025    TRIG 167 (H) 01/09/2025    HDL 31.1 01/09/2025       Assessment:    1.  Hypertrophic cardiomyopathy.  Today's echocardiographic study shows no left ventricular outflow tract obstruction.  There has been significant resolution of the hypertrophic muscle involving the basilar septum.  Outflow tract gradients are normal at rest or with Valsalva.    2.  Status post aortic valve replacement.  The bioprosthetic aortic valve is functioning normally with normal transvalvular gradient.    3.  Coronary disease.  Minimal on the basis of his cath.    4.  Shortness of breath.  In large part secondary to his weight.  Oxygen saturation is 96%.  To further determine what is going on we will do a BNP.  This is to help us determine how much of his shortness of breath is secondary to diastolic heart failure.    5.  Hypertension.  Will add low-dose diuretic therapy.

## 2025-04-12 ENCOUNTER — PATIENT OUTREACH (OUTPATIENT)
Dept: CARE COORDINATION | Age: 65
End: 2025-04-12
Payer: COMMERCIAL

## 2025-04-12 VITALS — SYSTOLIC BLOOD PRESSURE: 184 MMHG | HEART RATE: 82 BPM | DIASTOLIC BLOOD PRESSURE: 94 MMHG

## 2025-04-12 NOTE — PROGRESS NOTES
Called pt about elevated bp feels ok no headache  saw doctor  yesterday was put on  new water pill wiil  later today taking all other meds  is watching diet to stay on low sodium  did leave house  today to get feeling of fresh air for mood elevation Pt has  recently gone on disability   Will continue to monitor

## 2025-04-13 ENCOUNTER — PATIENT OUTREACH (OUTPATIENT)
Dept: CARE COORDINATION | Age: 65
End: 2025-04-13
Payer: COMMERCIAL

## 2025-04-13 NOTE — PROGRESS NOTES
Called pt he went outside ambulating  better will  water pill Torsemide 5mg in am has no swelling   Bp still elevated is  on Amlodipine 5mg  pt watching diet trying  to stay on low sodium diet  scale  not able to register weight  was 331 at doctors visit Fri pt takes all meds   will continue to monitor

## 2025-04-14 LAB
AORTIC VALVE MEAN GRADIENT: 7 MMHG
AORTIC VALVE PEAK VELOCITY: 1.67 M/S
AV PEAK GRADIENT: 11 MMHG
AVA (PEAK VEL): 2.37 CM2
AVA (VTI): 2.31 CM2
EJECTION FRACTION APICAL 4 CHAMBER: 52.3
EJECTION FRACTION: 58 %
LEFT VENTRICLE INTERNAL DIMENSION DIASTOLE: 4.26 CM (ref 3.5–6)
LEFT VENTRICULAR OUTFLOW TRACT DIAMETER: 2.3 CM
MITRAL VALVE E/A RATIO: 1.17
RIGHT VENTRICLE FREE WALL PEAK S': 0.05 CM/S

## 2025-04-15 ENCOUNTER — PATIENT OUTREACH (OUTPATIENT)
Dept: CARE COORDINATION | Age: 65
End: 2025-04-15
Payer: COMMERCIAL

## 2025-04-17 ENCOUNTER — PATIENT OUTREACH (OUTPATIENT)
Dept: CARE COORDINATION | Age: 65
End: 2025-04-17
Payer: COMMERCIAL

## 2025-04-17 NOTE — PROGRESS NOTES
Spoke to patient. Discussed SBP/DBP, blood thinners, bleeding precautions, orthostatic hypotension, calcium scoring. Pt voiced understanding, will continue to monitor. Mary Hudson RN

## 2025-04-18 ENCOUNTER — PATIENT OUTREACH (OUTPATIENT)
Dept: CARE COORDINATION | Age: 65
End: 2025-04-18
Payer: COMMERCIAL

## 2025-04-18 VITALS — DIASTOLIC BLOOD PRESSURE: 93 MMHG | SYSTOLIC BLOOD PRESSURE: 145 MMHG | HEART RATE: 75 BPM

## 2025-04-18 NOTE — PROGRESS NOTES
Called pt about elevated bp   he just took meds  feels ok will watch low sodium diet and walking a lot for exercise  Will continue to monitor

## 2025-04-19 ENCOUNTER — PATIENT OUTREACH (OUTPATIENT)
Dept: CARE COORDINATION | Age: 65
End: 2025-04-19
Payer: COMMERCIAL

## 2025-04-19 VITALS — HEART RATE: 79 BPM | SYSTOLIC BLOOD PRESSURE: 136 MMHG | DIASTOLIC BLOOD PRESSURE: 94 MMHG

## 2025-04-20 ENCOUNTER — PATIENT OUTREACH (OUTPATIENT)
Dept: CARE COORDINATION | Age: 65
End: 2025-04-20
Payer: COMMERCIAL

## 2025-04-20 ENCOUNTER — DOCUMENTATION (OUTPATIENT)
Dept: CARE COORDINATION | Age: 65
End: 2025-04-20
Payer: COMMERCIAL

## 2025-04-20 NOTE — PROGRESS NOTES
Called  pt about bp he feels ok he  [facility] took meds this am trying to eat better trying to avoid fast food will walk for exercise   Will continue to monitor

## 2025-04-22 NOTE — PROGRESS NOTES
Spoke to patient. States as soon as he wakes up in the morning, he takes his BP medication, then takes his BP. Educated pt to wait an hour after taking his medication to give it a chance to take effect, then to take his BP reading. Pt voiced understanding

## 2025-04-25 ENCOUNTER — PATIENT OUTREACH (OUTPATIENT)
Dept: CARE COORDINATION | Age: 65
End: 2025-04-25
Payer: COMMERCIAL

## 2025-04-29 LAB
ALBUMIN SERPL-MCNC: 4.7 G/DL (ref 3.6–5.1)
ALBUMIN SERPL-MCNC: 4.7 G/DL (ref 3.6–5.1)
ALP SERPL-CCNC: 80 U/L (ref 35–144)
ALP SERPL-CCNC: 82 U/L (ref 35–144)
ALT SERPL-CCNC: 52 U/L (ref 9–46)
ALT SERPL-CCNC: 52 U/L (ref 9–46)
ANION GAP SERPL CALCULATED.4IONS-SCNC: 13 MMOL/L (CALC) (ref 7–17)
ANION GAP SERPL CALCULATED.4IONS-SCNC: 13 MMOL/L (CALC) (ref 7–17)
AST SERPL-CCNC: 54 U/L (ref 10–35)
AST SERPL-CCNC: 54 U/L (ref 10–35)
BILIRUB SERPL-MCNC: 1 MG/DL (ref 0.2–1.2)
BILIRUB SERPL-MCNC: 1 MG/DL (ref 0.2–1.2)
BNP SERPL-MCNC: 35 PG/ML
BUN SERPL-MCNC: 10 MG/DL (ref 7–25)
BUN SERPL-MCNC: 10 MG/DL (ref 7–25)
CALCIUM SERPL-MCNC: 9.3 MG/DL (ref 8.6–10.3)
CALCIUM SERPL-MCNC: 9.5 MG/DL (ref 8.6–10.3)
CHLORIDE SERPL-SCNC: 98 MMOL/L (ref 98–110)
CHLORIDE SERPL-SCNC: 99 MMOL/L (ref 98–110)
CHOLEST SERPL-MCNC: 145 MG/DL
CHOLEST/HDLC SERPL: 4.8 (CALC)
CO2 SERPL-SCNC: 26 MMOL/L (ref 20–32)
CO2 SERPL-SCNC: 26 MMOL/L (ref 20–32)
CREAT SERPL-MCNC: 0.78 MG/DL (ref 0.7–1.35)
CREAT SERPL-MCNC: 0.79 MG/DL (ref 0.7–1.35)
EGFRCR SERPLBLD CKD-EPI 2021: 100 ML/MIN/1.73M2
EGFRCR SERPLBLD CKD-EPI 2021: 100 ML/MIN/1.73M2
EST. AVERAGE GLUCOSE BLD GHB EST-MCNC: 226 MG/DL
EST. AVERAGE GLUCOSE BLD GHB EST-MCNC: 226 MG/DL
EST. AVERAGE GLUCOSE BLD GHB EST-SCNC: 12.5 MMOL/L
EST. AVERAGE GLUCOSE BLD GHB EST-SCNC: 12.5 MMOL/L
GLUCOSE SERPL-MCNC: 221 MG/DL (ref 65–99)
GLUCOSE SERPL-MCNC: 224 MG/DL (ref 65–99)
HBA1C MFR BLD: 9.5 %
HBA1C MFR BLD: 9.5 %
HDLC SERPL-MCNC: 30 MG/DL
LDLC SERPL CALC-MCNC: 94 MG/DL (CALC)
NONHDLC SERPL-MCNC: 115 MG/DL (CALC)
POTASSIUM SERPL-SCNC: 4.5 MMOL/L (ref 3.5–5.3)
POTASSIUM SERPL-SCNC: 4.5 MMOL/L (ref 3.5–5.3)
PROT SERPL-MCNC: 8 G/DL (ref 6.1–8.1)
PROT SERPL-MCNC: 8 G/DL (ref 6.1–8.1)
SODIUM SERPL-SCNC: 137 MMOL/L (ref 135–146)
SODIUM SERPL-SCNC: 138 MMOL/L (ref 135–146)
TRIGL SERPL-MCNC: 110 MG/DL

## 2025-05-01 ENCOUNTER — TELEPHONE (OUTPATIENT)
Dept: PRIMARY CARE | Facility: CLINIC | Age: 65
End: 2025-05-01
Payer: COMMERCIAL

## 2025-05-01 ENCOUNTER — PATIENT OUTREACH (OUTPATIENT)
Dept: CARE COORDINATION | Age: 65
End: 2025-05-01
Payer: COMMERCIAL

## 2025-05-01 NOTE — PROGRESS NOTES
5/1 Spoke to patient, discussed DM definition/ Relation to heart disease, nephropathy, neuropathy, retinopathy, Risks/complications. Sent DM materials, DM Videos,Foot care, Dipping urine, DN walk barefoot,  VSS WNL  Will continue to monitor.

## 2025-05-01 NOTE — TELEPHONE ENCOUNTER
----- Message from Prieto Orozco sent at 5/1/2025 10:47 AM EDT -----  Blood sugars have not been good patient needs follow-up nonemergent  ----- Message -----  From: [facility] In  Sent: 4/28/2025  10:32 PM EDT  To: Prieto Orozco DO

## 2025-05-02 ENCOUNTER — TELEPHONE (OUTPATIENT)
Dept: CARDIOLOGY | Facility: CLINIC | Age: 65
End: 2025-05-02
Payer: COMMERCIAL

## 2025-05-02 NOTE — TELEPHONE ENCOUNTER
----- Message from Dwight Harrison sent at 5/1/2025  9:27 PM EDT -----  Latest blood test (BNP) is normal.  No changes in medication at this time.  ----- Message -----  From: [facility]  Sent: 4/29/2025  10:25 AM EDT  To: Dwight Harrison MD

## 2025-05-07 DIAGNOSIS — E11.9 CONTROLLED TYPE 2 DIABETES MELLITUS WITHOUT COMPLICATION, WITHOUT LONG-TERM CURRENT USE OF INSULIN: Primary | ICD-10-CM

## 2025-05-08 ENCOUNTER — APPOINTMENT (OUTPATIENT)
Dept: PRIMARY CARE | Facility: CLINIC | Age: 65
End: 2025-05-08
Payer: COMMERCIAL

## 2025-05-08 ENCOUNTER — PATIENT OUTREACH (OUTPATIENT)
Dept: CARE COORDINATION | Age: 65
End: 2025-05-08

## 2025-05-08 VITALS
HEART RATE: 84 BPM | WEIGHT: 315 LBS | HEIGHT: 75 IN | BODY MASS INDEX: 39.17 KG/M2 | OXYGEN SATURATION: 93 % | SYSTOLIC BLOOD PRESSURE: 131 MMHG | DIASTOLIC BLOOD PRESSURE: 83 MMHG

## 2025-05-08 DIAGNOSIS — E11.9 CONTROLLED TYPE 2 DIABETES MELLITUS WITHOUT COMPLICATION, WITHOUT LONG-TERM CURRENT USE OF INSULIN: Primary | ICD-10-CM

## 2025-05-08 PROCEDURE — 3008F BODY MASS INDEX DOCD: CPT | Performed by: FAMILY MEDICINE

## 2025-05-08 PROCEDURE — 3075F SYST BP GE 130 - 139MM HG: CPT | Performed by: FAMILY MEDICINE

## 2025-05-08 PROCEDURE — 1036F TOBACCO NON-USER: CPT | Performed by: FAMILY MEDICINE

## 2025-05-08 PROCEDURE — 3048F LDL-C <100 MG/DL: CPT | Performed by: FAMILY MEDICINE

## 2025-05-08 PROCEDURE — 99214 OFFICE O/P EST MOD 30 MIN: CPT | Performed by: FAMILY MEDICINE

## 2025-05-08 PROCEDURE — 3051F HG A1C>EQUAL 7.0%<8.0%: CPT | Performed by: FAMILY MEDICINE

## 2025-05-08 PROCEDURE — 4010F ACE/ARB THERAPY RXD/TAKEN: CPT | Performed by: FAMILY MEDICINE

## 2025-05-08 PROCEDURE — 3079F DIAST BP 80-89 MM HG: CPT | Performed by: FAMILY MEDICINE

## 2025-05-08 ASSESSMENT — PATIENT HEALTH QUESTIONNAIRE - PHQ9
SUM OF ALL RESPONSES TO PHQ9 QUESTIONS 1 & 2: 0
1. LITTLE INTEREST OR PLEASURE IN DOING THINGS: NOT AT ALL
2. FEELING DOWN, DEPRESSED OR HOPELESS: NOT AT ALL

## 2025-05-08 ASSESSMENT — ENCOUNTER SYMPTOMS
CONSTITUTIONAL NEGATIVE: 1
MUSCULOSKELETAL NEGATIVE: 1
RESPIRATORY NEGATIVE: 1
CARDIOVASCULAR NEGATIVE: 1

## 2025-05-08 NOTE — PROGRESS NOTES
RPM Patient Call  Spoke to patient, Discussed foot care, Dipping urine, DN walk barefoot, discussed DM videos, Pt had MD appt, MD started pt on Ozempic.   Discussed diet, effects of salt on body/heart, salt alternatives. Water intake, Restrict processed foods. Limit dairy milk products as thickens secretions  Will continue to monitor. Mary Hudson RN    Pt Education: Blood pressure, Lifestyle modifications, Diet, High blood sugar, and Low blood sugar  Barriers: n/a  Topics for Daily Review: n/a  Pt demonstrates clear understanding: Yes    VS Readings  Daily Weight:  There were no vitals filed for this visit.   Last 3 Weights:  Wt Readings from Last 7 Encounters:   05/08/25 (!) 152 kg (335 lb 12.8 oz)   04/11/25 (!) 151 kg (332 lb)   04/03/25 (!) 151 kg (333 lb)   02/26/25 (!) 154 kg (340 lb)   10/11/24 (!) 152 kg (336 lb)   07/29/24 147 kg (325 lb)   06/21/24 148 kg (326 lb)     Blood Pressure:  BP Readings from Last 3 Encounters:   05/08/25 131/83   04/19/25 (!) 136/94   04/18/25 (!) 145/93          Masimo Device: Yes   Masimo Clinical Impression: MMO/RPM

## 2025-05-08 NOTE — PROGRESS NOTES
Subjective   Patient ID: Jules Neves is a 64 y.o. male who presents for Follow-up.  HPI    Review of Systems   Constitutional: Negative.    HENT: Negative.     Respiratory: Negative.     Cardiovascular: Negative.    Genitourinary: Negative.    Musculoskeletal: Negative.        Objective   Physical Exam  Constitutional:       Appearance: Normal appearance.   HENT:      Mouth/Throat:      Mouth: Mucous membranes are moist.   Eyes:      Pupils: Pupils are equal, round, and reactive to light.   Cardiovascular:      Rate and Rhythm: Normal rate and regular rhythm.   Pulmonary:      Effort: Pulmonary effort is normal.   Musculoskeletal:         General: Tenderness and deformity present. Normal range of motion.   Neurological:      Mental Status: He is alert.         Assessment/Plan   Problem List Items Addressed This Visit    None  Visit Diagnoses         Codes      Controlled type 2 diabetes mellitus without complication, without long-term current use of insulin    -  Primary E11.9    Relevant Medications    semaglutide 0.25 mg or 0.5 mg (2 mg/3 mL) pen injector                 Prieto Orozco DO 05/08/25 2:56 PM

## 2025-05-09 DIAGNOSIS — I10 HYPERTENSION, UNSPECIFIED TYPE: ICD-10-CM

## 2025-05-09 RX ORDER — AMLODIPINE BESYLATE 5 MG/1
5 TABLET ORAL DAILY
Qty: 90 TABLET | Refills: 0 | Status: SHIPPED | OUTPATIENT
Start: 2025-05-09

## 2025-05-09 RX ORDER — ATORVASTATIN CALCIUM 80 MG/1
80 TABLET, FILM COATED ORAL DAILY
Qty: 90 TABLET | Refills: 0 | Status: SHIPPED | OUTPATIENT
Start: 2025-05-09

## 2025-05-12 ENCOUNTER — TELEPHONE (OUTPATIENT)
Dept: PHARMACY | Facility: HOSPITAL | Age: 65
End: 2025-05-12
Payer: COMMERCIAL

## 2025-05-12 RX ORDER — PANTOPRAZOLE SODIUM 40 MG/1
40 TABLET, DELAYED RELEASE ORAL
Qty: 90 TABLET | Refills: 1 | Status: SHIPPED | OUTPATIENT
Start: 2025-05-12

## 2025-05-12 NOTE — TELEPHONE ENCOUNTER
Reached out to patient to schedule pharmacy visit for diabetes management upon request of PCP. Most recent A1c of 9.5%. Patient asks to cancel pharmacy referral due to working with care management nurse and PCP for current management.    Con Deluna, PharmD

## 2025-05-15 ENCOUNTER — PATIENT OUTREACH (OUTPATIENT)
Dept: CARE COORDINATION | Age: 65
End: 2025-05-15
Payer: COMMERCIAL

## 2025-05-15 NOTE — PROGRESS NOTES
Week 8 RPM Patient Call  5/15 Spoke to patient, Reviewed importance of exercise, fidgeting, chewing gum, walk after largest meal/large muscle movement after dinner/ C & DBing, pursed lip breathing,  Discussed keeping med list in wallet with meds, allergies, providers names/phone numbers, emergency contacts, safety in home re: smoke, CO detectors. Fire extinguishers. Discussed needle safety. .Reviewed MD appointment schedule for opthamology/optometry, dental, physical, lab work, and colonoscopy. States they are up to date or is scheduled. Has access to My Chart. Will continue to monitor. Mary Hudson RN    Pt Education: Lifestyle modifications, Exercise, and Follow up appointments  Barriers: n/a  Topics for Daily Review: n/a  Pt demonstrates clear understanding: Yes    VS Readings  Daily Weight:  There were no vitals filed for this visit.   Last 3 Weights:  Wt Readings from Last 7 Encounters:   05/08/25 (!) 152 kg (335 lb 12.8 oz)   04/11/25 (!) 151 kg (332 lb)   04/03/25 (!) 151 kg (333 lb)   02/26/25 (!) 154 kg (340 lb)   10/11/24 (!) 152 kg (336 lb)   07/29/24 147 kg (325 lb)   06/21/24 148 kg (326 lb)     Blood Pressure:  BP Readings from Last 3 Encounters:   05/08/25 131/83   04/19/25 (!) 136/94   04/18/25 (!) 145/93          Masimo Device: Yes   Masimo Clinical Impression: MMO/RPM

## 2025-05-22 ENCOUNTER — PATIENT OUTREACH (OUTPATIENT)
Dept: CARE COORDINATION | Age: 65
End: 2025-05-22
Payer: COMMERCIAL

## 2025-05-22 NOTE — PROGRESS NOTES
Patient Discharge Call        5/22 Spoke to patient. Reviewed emergency protocols, living will, Knows when to go to ER/seek medical treatment, med alert bracelet, CPR, LIFe alert. f/u appts, readiness for discharge. Education complete. Program goals and education complete. Discharge today. Last vital sign reading 5/21/2025. Wants [facility]. Equipment to be PU by paramedic 5/27/25, 8AM - 12NOON. VSS upon discharge. Mary Hudson RN      Equipment scheduled for : 5/27/2025  Added to EMS schedule: TEAMS & EPIC    Referral sent for [facility] for: HTN    DC'd from [facility] MMO/RPM done

## 2025-06-16 ENCOUNTER — TELEPHONE (OUTPATIENT)
Dept: PRIMARY CARE | Facility: CLINIC | Age: 65
End: 2025-06-16
Payer: COMMERCIAL

## 2025-06-16 NOTE — TELEPHONE ENCOUNTER
Lvm that he needs to schedule appointment to have disability paperwork filled out per Dr Rivas. Virtual is okay. As of right now first available is 6.27.25.

## 2025-06-26 ENCOUNTER — TELEPHONE (OUTPATIENT)
Dept: CARDIOLOGY | Facility: HOSPITAL | Age: 65
End: 2025-06-26
Payer: COMMERCIAL

## 2025-06-30 ENCOUNTER — TELEPHONE (OUTPATIENT)
Dept: CARDIOLOGY | Facility: HOSPITAL | Age: 65
End: 2025-06-30
Payer: COMMERCIAL

## 2025-07-02 ENCOUNTER — HOSPITAL ENCOUNTER (OUTPATIENT)
Dept: CARDIOLOGY | Facility: CLINIC | Age: 65
Discharge: HOME | End: 2025-07-02
Payer: COMMERCIAL

## 2025-07-02 DIAGNOSIS — I42.2 HYPERTROPHIC CARDIOMYOPATHY (MULTI): ICD-10-CM

## 2025-07-02 LAB
ATRIAL RATE: 74 BPM
P AXIS: 43 DEGREES
P OFFSET: 144 MS
P ONSET: 102 MS
PR INTERVAL: 234 MS
Q ONSET: 219 MS
QRS COUNT: 12 BEATS
QRS DURATION: 98 MS
QT INTERVAL: 408 MS
QTC CALCULATION(BAZETT): 452 MS
QTC FREDERICIA: 437 MS
R AXIS: 12 DEGREES
T AXIS: 75 DEGREES
T OFFSET: 423 MS
VENTRICULAR RATE: 74 BPM

## 2025-07-02 PROCEDURE — 2500000004 HC RX 250 GENERAL PHARMACY W/ HCPCS (ALT 636 FOR OP/ED): Performed by: INTERNAL MEDICINE

## 2025-07-02 PROCEDURE — C8924 2D TTE W OR W/O FOL W/CON,FU: HCPCS

## 2025-07-02 PROCEDURE — 93308 TTE F-UP OR LMTD: CPT | Performed by: INTERNAL MEDICINE

## 2025-07-02 RX ADMIN — PERFLUTREN 2 ML OF DILUTION: 6.52 INJECTION, SUSPENSION INTRAVENOUS at 11:43

## 2025-07-03 LAB
AORTIC VALVE MEAN GRADIENT: 10 MMHG
AORTIC VALVE PEAK VELOCITY: 2.07 M/S
AV PEAK GRADIENT: 17 MMHG
AVA (PEAK VEL): 1.7 CM2
AVA (VTI): 1.46 CM2
EJECTION FRACTION APICAL 4 CHAMBER: 53.9
EJECTION FRACTION: 53 %
LEFT VENTRICLE INTERNAL DIMENSION DIASTOLE: 4.6 CM (ref 3.5–6)
LEFT VENTRICULAR OUTFLOW TRACT DIAMETER: 2.2 CM
MITRAL VALVE E/A RATIO: 0.92
RIGHT VENTRICLE FREE WALL PEAK S': 0.1 CM/S
TRICUSPID ANNULAR PLANE SYSTOLIC EXCURSION: 1.9 CM

## 2025-07-05 DIAGNOSIS — I42.2 HYPERTROPHIC CARDIOMYOPATHY (MULTI): Primary | ICD-10-CM

## 2025-07-08 DIAGNOSIS — E11.69 TYPE 2 DIABETES MELLITUS WITH OTHER SPECIFIED COMPLICATION, WITHOUT LONG-TERM CURRENT USE OF INSULIN: ICD-10-CM

## 2025-07-09 RX ORDER — METFORMIN HYDROCHLORIDE 500 MG/1
500 TABLET ORAL
Qty: 180 TABLET | Refills: 0 | Status: SHIPPED | OUTPATIENT
Start: 2025-07-09

## 2025-07-10 ENCOUNTER — TELEPHONE (OUTPATIENT)
Dept: CARDIOLOGY | Facility: HOSPITAL | Age: 65
End: 2025-07-10
Payer: COMMERCIAL

## 2025-07-10 NOTE — TELEPHONE ENCOUNTER
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  He has worked at an [employer] organization for 16 years (10 at [employer], 6 at [employer]).  He is interested in applying for permanent disability and social security.  Accepted offer to conference call [employer] tomorrow for some clarity and direction.

## 2025-07-11 ENCOUNTER — TELEPHONE (OUTPATIENT)
Dept: CARDIOLOGY | Facility: HOSPITAL | Age: 65
End: 2025-07-11
Payer: COMMERCIAL

## 2025-07-11 NOTE — TELEPHONE ENCOUNTER
Conference call with Hector and Mary Kay from [employer] ([phone].)  Annette explained that [employer] has everything that they need for his disability application and it has been sent to their third-party [employer] ([phone]) for processing.  They will need the designation of beneficiaries and his state tax form, neither will hold up processing.  She is providing copies of those forms to Hector via [employer] and email.  [employer] has  days to process, [facility] has 60 calendar days to return to [employer].  [employer] sent the information to [facility] on/around 6/24/25.  She advised to wait a 1-2 weeks and then follow-up with [facility] if he hasn't heard anything.  Spoke with Hector following the conference call to review information shared.  Hector plans to schedule an appointment with his PCP and will keep SW updated of any calls/correspondance from [employer]/[facility] short-term.

## 2025-07-15 ENCOUNTER — APPOINTMENT (OUTPATIENT)
Dept: PRIMARY CARE | Facility: CLINIC | Age: 65
End: 2025-07-15
Payer: COMMERCIAL

## 2025-07-15 ENCOUNTER — TELEPHONE (OUTPATIENT)
Dept: CARDIOLOGY | Facility: HOSPITAL | Age: 65
End: 2025-07-15

## 2025-07-15 VITALS
HEART RATE: 76 BPM | WEIGHT: 315 LBS | OXYGEN SATURATION: 94 % | SYSTOLIC BLOOD PRESSURE: 145 MMHG | DIASTOLIC BLOOD PRESSURE: 83 MMHG | BODY MASS INDEX: 39.17 KG/M2 | HEIGHT: 75 IN

## 2025-07-15 DIAGNOSIS — I48.0 PAROXYSMAL ATRIAL FIBRILLATION (MULTI): ICD-10-CM

## 2025-07-15 DIAGNOSIS — I48.3 TYPICAL ATRIAL FLUTTER (MULTI): ICD-10-CM

## 2025-07-15 DIAGNOSIS — E11.51 TYPE 2 DIABETES MELLITUS WITH DIABETIC PERIPHERAL ANGIOPATHY WITHOUT GANGRENE, WITHOUT LONG-TERM CURRENT USE OF INSULIN (MULTI): ICD-10-CM

## 2025-07-15 DIAGNOSIS — E11.9 CONTROLLED TYPE 2 DIABETES MELLITUS WITHOUT COMPLICATION, WITHOUT LONG-TERM CURRENT USE OF INSULIN: Primary | ICD-10-CM

## 2025-07-15 PROCEDURE — 99214 OFFICE O/P EST MOD 30 MIN: CPT | Performed by: FAMILY MEDICINE

## 2025-07-15 PROCEDURE — 4010F ACE/ARB THERAPY RXD/TAKEN: CPT | Performed by: FAMILY MEDICINE

## 2025-07-15 PROCEDURE — 3051F HG A1C>EQUAL 7.0%<8.0%: CPT | Performed by: FAMILY MEDICINE

## 2025-07-15 PROCEDURE — 3079F DIAST BP 80-89 MM HG: CPT | Performed by: FAMILY MEDICINE

## 2025-07-15 PROCEDURE — 3008F BODY MASS INDEX DOCD: CPT | Performed by: FAMILY MEDICINE

## 2025-07-15 PROCEDURE — 3048F LDL-C <100 MG/DL: CPT | Performed by: FAMILY MEDICINE

## 2025-07-15 PROCEDURE — 3077F SYST BP >= 140 MM HG: CPT | Performed by: FAMILY MEDICINE

## 2025-07-15 RX ORDER — DULAGLUTIDE 1.5 MG/.5ML
1.5 INJECTION, SOLUTION SUBCUTANEOUS WEEKLY
Qty: 2 ML | Refills: 0 | Status: SHIPPED | OUTPATIENT
Start: 2025-07-15

## 2025-07-15 ASSESSMENT — ENCOUNTER SYMPTOMS
GASTROINTESTINAL NEGATIVE: 1
ARTHRALGIAS: 1
FATIGUE: 1
JOINT SWELLING: 1
MYALGIAS: 1
SHORTNESS OF BREATH: 1
NEUROLOGICAL NEGATIVE: 1

## 2025-07-15 NOTE — TELEPHONE ENCOUNTER
Returned Bill's call from yesterday.  He states that he had a doctor's appointment this morning and his pcp is completing the disability forms for [place].  Reviewed call with [employer] from last week.  Would like to discuss Social Security next week.  Encouraged him to reach out in the interim with any updates or questions.

## 2025-07-15 NOTE — PROGRESS NOTES
Subjective   Patient ID: Jules Neves is a 64 y.o. male who presents for Medication Question (ozempic) and Follow-up (Discuss disability ).  HPI  Hads some nausea with ozempic will try trulicity  Patient has a history of cardiomyopathy status post valvular surgery patient still has some peripheral edema lower extremities exertional dyspnea and fatigue.    Patient also has multiple orthopedic issues including severe degenerative osteoarthritis to the left knee with decreased ambulation necessitating cane.    Patient also has non-insulin-dependent diabetes which is uncontrolled needs tightening of his sugars.    Patient also has exogenous morbid obesity which is worsening his already poor medical health  Review of Systems   Constitutional:  Positive for fatigue.   HENT: Negative.     Respiratory:  Positive for shortness of breath.    Cardiovascular:  Positive for leg swelling.   Gastrointestinal: Negative.    Genitourinary: Negative.    Musculoskeletal:  Positive for arthralgias, joint swelling and myalgias.   Neurological: Negative.        Objective   Physical Exam  Constitutional:       Appearance: Normal appearance.   HENT:      Head: Normocephalic.      Mouth/Throat:      Mouth: Mucous membranes are moist.   Eyes:      Extraocular Movements: Extraocular movements intact.      Pupils: Pupils are equal, round, and reactive to light.   Cardiovascular:      Rate and Rhythm: Normal rate and regular rhythm.      Heart sounds: Murmur heard.   Pulmonary:      Effort: No respiratory distress.      Breath sounds: No wheezing or rales.   Musculoskeletal:         General: Tenderness and deformity present.      Right lower leg: Edema present.      Left lower leg: Edema present.   Skin:     General: Skin is warm.   Neurological:      Mental Status: He is alert.         Assessment/Plan   Problem List Items Addressed This Visit    None  Visit Diagnoses         Codes      Controlled type 2 diabetes mellitus without  complication, without long-term current use of insulin    -  Primary E11.9    Relevant Medications    dulaglutide (Trulicity) 1.5 mg/0.5 mL pen injector injection                 Prieto Orozco DO 07/15/25 9:40 AM

## 2025-07-22 ENCOUNTER — TELEPHONE (OUTPATIENT)
Dept: CARDIOLOGY | Facility: HOSPITAL | Age: 65
End: 2025-07-22
Payer: COMMERCIAL

## 2025-07-22 NOTE — TELEPHONE ENCOUNTER
Call placed to Hector who explained that he spoke with Omar on Friday and his claim is under reconsideration.  Scheduled time for Thursday to reset Social Security login to then complete that application.

## 2025-08-01 ENCOUNTER — TELEPHONE (OUTPATIENT)
Dept: CARDIOLOGY | Facility: HOSPITAL | Age: 65
End: 2025-08-01
Payer: COMMERCIAL

## 2025-08-01 NOTE — TELEPHONE ENCOUNTER
Hector called to share an update.  He is still waiting for updates from Omar and PETER.  He is making progress on his SSDI application.  Will keep posted on progress and plan to reconnect next week

## 2025-08-05 ENCOUNTER — TELEPHONE (OUTPATIENT)
Dept: CARDIOLOGY | Facility: HOSPITAL | Age: 65
End: 2025-08-05
Payer: COMMERCIAL

## 2025-08-05 NOTE — TELEPHONE ENCOUNTER
Spoke with Hector who was informed that he was denied [employer] disability.  He would like to appeal.  There are two forms that will need completed.  Found the forms referenced in the denial letter and sent them to Hector.  Also sent info on two meals on wheels programs.

## 2025-08-07 DIAGNOSIS — I10 HYPERTENSION, UNSPECIFIED TYPE: ICD-10-CM

## 2025-08-07 RX ORDER — ATORVASTATIN CALCIUM 80 MG/1
80 TABLET, FILM COATED ORAL DAILY
Qty: 90 TABLET | Refills: 0 | Status: SHIPPED | OUTPATIENT
Start: 2025-08-07

## 2025-08-13 ENCOUNTER — TELEPHONE (OUTPATIENT)
Dept: CARDIOLOGY | Facility: HOSPITAL | Age: 65
End: 2025-08-13
Payer: COMMERCIAL

## 2025-08-13 DIAGNOSIS — I10 HYPERTENSION, UNSPECIFIED TYPE: ICD-10-CM

## 2025-08-14 RX ORDER — AMLODIPINE BESYLATE 5 MG/1
5 TABLET ORAL DAILY
Qty: 90 TABLET | Refills: 0 | Status: SHIPPED | OUTPATIENT
Start: 2025-08-14

## 2025-08-19 ENCOUNTER — TELEPHONE (OUTPATIENT)
Dept: CARDIOLOGY | Facility: HOSPITAL | Age: 65
End: 2025-08-19
Payer: COMMERCIAL

## 2025-08-21 ENCOUNTER — TELEPHONE (OUTPATIENT)
Dept: CARDIOLOGY | Facility: HOSPITAL | Age: 65
End: 2025-08-21
Payer: COMMERCIAL

## 2025-08-22 ENCOUNTER — OFFICE VISIT (OUTPATIENT)
Dept: PRIMARY CARE | Facility: CLINIC | Age: 65
End: 2025-08-22
Payer: COMMERCIAL

## 2025-08-22 VITALS
HEIGHT: 75 IN | OXYGEN SATURATION: 93 % | DIASTOLIC BLOOD PRESSURE: 90 MMHG | SYSTOLIC BLOOD PRESSURE: 143 MMHG | BODY MASS INDEX: 40 KG/M2 | HEART RATE: 87 BPM

## 2025-08-22 DIAGNOSIS — I42.2 HYPERTROPHIC CARDIOMYOPATHY (MULTI): Primary | ICD-10-CM

## 2025-08-22 PROCEDURE — 3080F DIAST BP >= 90 MM HG: CPT | Performed by: FAMILY MEDICINE

## 2025-08-22 PROCEDURE — 3077F SYST BP >= 140 MM HG: CPT | Performed by: FAMILY MEDICINE

## 2025-08-22 PROCEDURE — 99214 OFFICE O/P EST MOD 30 MIN: CPT | Performed by: FAMILY MEDICINE

## 2025-08-22 PROCEDURE — 1036F TOBACCO NON-USER: CPT | Performed by: FAMILY MEDICINE

## 2025-08-22 PROCEDURE — 4010F ACE/ARB THERAPY RXD/TAKEN: CPT | Performed by: FAMILY MEDICINE

## 2025-08-22 ASSESSMENT — COLUMBIA-SUICIDE SEVERITY RATING SCALE - C-SSRS
1. IN THE PAST MONTH, HAVE YOU WISHED YOU WERE DEAD OR WISHED YOU COULD GO TO SLEEP AND NOT WAKE UP?: NO
2. HAVE YOU ACTUALLY HAD ANY THOUGHTS OF KILLING YOURSELF?: NO
6. HAVE YOU EVER DONE ANYTHING, STARTED TO DO ANYTHING, OR PREPARED TO DO ANYTHING TO END YOUR LIFE?: NO

## 2025-08-22 ASSESSMENT — PATIENT HEALTH QUESTIONNAIRE - PHQ9
SUM OF ALL RESPONSES TO PHQ9 QUESTIONS 1 AND 2: 0
1. LITTLE INTEREST OR PLEASURE IN DOING THINGS: NOT AT ALL
2. FEELING DOWN, DEPRESSED OR HOPELESS: NOT AT ALL

## 2025-08-22 ASSESSMENT — ENCOUNTER SYMPTOMS
ARTHRALGIAS: 1
PSYCHIATRIC NEGATIVE: 1
GASTROINTESTINAL NEGATIVE: 1
ENDOCRINE NEGATIVE: 1
JOINT SWELLING: 1
PALPITATIONS: 0
COUGH: 1
CONSTITUTIONAL NEGATIVE: 1

## 2025-08-26 ENCOUNTER — TELEPHONE (OUTPATIENT)
Dept: CARDIOLOGY | Facility: HOSPITAL | Age: 65
End: 2025-08-26
Payer: COMMERCIAL

## 2025-08-27 ENCOUNTER — TELEPHONE (OUTPATIENT)
Dept: CARDIOLOGY | Facility: HOSPITAL | Age: 65
End: 2025-08-27
Payer: COMMERCIAL

## 2025-08-28 ENCOUNTER — TELEPHONE (OUTPATIENT)
Dept: CARDIOLOGY | Facility: HOSPITAL | Age: 65
End: 2025-08-28
Payer: COMMERCIAL

## 2025-09-03 ENCOUNTER — TELEPHONE (OUTPATIENT)
Dept: CARDIOLOGY | Facility: HOSPITAL | Age: 65
End: 2025-09-03
Payer: COMMERCIAL

## 2025-09-18 ENCOUNTER — APPOINTMENT (OUTPATIENT)
Dept: PRIMARY CARE | Facility: CLINIC | Age: 65
End: 2025-09-18
Payer: COMMERCIAL

## 2025-09-23 ENCOUNTER — APPOINTMENT (OUTPATIENT)
Dept: CARDIOLOGY | Facility: CLINIC | Age: 65
End: 2025-09-23
Payer: COMMERCIAL